# Patient Record
Sex: MALE | Race: WHITE | NOT HISPANIC OR LATINO
[De-identification: names, ages, dates, MRNs, and addresses within clinical notes are randomized per-mention and may not be internally consistent; named-entity substitution may affect disease eponyms.]

---

## 2022-07-29 ENCOUNTER — APPOINTMENT (OUTPATIENT)
Dept: VASCULAR SURGERY | Facility: CLINIC | Age: 82
End: 2022-07-29

## 2022-07-29 PROCEDURE — 99203 OFFICE O/P NEW LOW 30 MIN: CPT

## 2022-09-16 ENCOUNTER — APPOINTMENT (OUTPATIENT)
Dept: VASCULAR SURGERY | Facility: CLINIC | Age: 82
End: 2022-09-16

## 2022-09-16 PROCEDURE — 99213 OFFICE O/P EST LOW 20 MIN: CPT

## 2022-12-06 ENCOUNTER — TRANSCRIPTION ENCOUNTER (OUTPATIENT)
Age: 82
End: 2022-12-06

## 2023-10-20 ENCOUNTER — RESULT REVIEW (OUTPATIENT)
Age: 83
End: 2023-10-20

## 2023-10-20 ENCOUNTER — TRANSCRIPTION ENCOUNTER (OUTPATIENT)
Age: 83
End: 2023-10-20

## 2023-10-23 DIAGNOSIS — I35.0 NONRHEUMATIC AORTIC (VALVE) STENOSIS: ICD-10-CM

## 2023-10-23 PROBLEM — Z00.00 ENCOUNTER FOR PREVENTIVE HEALTH EXAMINATION: Status: ACTIVE | Noted: 2023-10-23

## 2023-10-30 ENCOUNTER — RESULT REVIEW (OUTPATIENT)
Age: 83
End: 2023-10-30

## 2023-11-07 ENCOUNTER — APPOINTMENT (OUTPATIENT)
Dept: CARDIOTHORACIC SURGERY | Facility: CLINIC | Age: 83
End: 2023-11-07

## 2023-11-13 ENCOUNTER — APPOINTMENT (OUTPATIENT)
Dept: CARDIOTHORACIC SURGERY | Facility: CLINIC | Age: 83
End: 2023-11-13
Payer: MEDICARE

## 2023-11-13 DIAGNOSIS — Z86.39 PERSONAL HISTORY OF OTHER ENDOCRINE, NUTRITIONAL AND METABOLIC DISEASE: ICD-10-CM

## 2023-11-13 DIAGNOSIS — H71.92 UNSPECIFIED CHOLESTEATOMA, LEFT EAR: ICD-10-CM

## 2023-11-13 DIAGNOSIS — Z95.5 PRESENCE OF CORONARY ANGIOPLASTY IMPLANT AND GRAFT: ICD-10-CM

## 2023-11-13 DIAGNOSIS — Z87.891 PERSONAL HISTORY OF NICOTINE DEPENDENCE: ICD-10-CM

## 2023-11-13 DIAGNOSIS — Z86.69 PERSONAL HISTORY OF OTHER DISEASES OF THE NERVOUS SYSTEM AND SENSE ORGANS: ICD-10-CM

## 2023-11-13 DIAGNOSIS — Z87.438 PERSONAL HISTORY OF OTHER DISEASES OF MALE GENITAL ORGANS: ICD-10-CM

## 2023-11-13 DIAGNOSIS — Z87.2 PERSONAL HISTORY OF DISEASES OF THE SKIN AND SUBCUTANEOUS TISSUE: ICD-10-CM

## 2023-11-13 DIAGNOSIS — E78.5 HYPERLIPIDEMIA, UNSPECIFIED: ICD-10-CM

## 2023-11-13 DIAGNOSIS — Z86.79 PERSONAL HISTORY OF OTHER DISEASES OF THE CIRCULATORY SYSTEM: ICD-10-CM

## 2023-11-13 PROCEDURE — 99204 OFFICE O/P NEW MOD 45 MIN: CPT | Mod: 95

## 2023-11-14 PROBLEM — Z87.2 HISTORY OF PSORIATIC ARTHRITIS: Status: RESOLVED | Noted: 2023-11-14 | Resolved: 2023-11-14

## 2023-11-14 PROBLEM — H71.92 CHOLESTEATOMA OF LEFT EAR: Status: RESOLVED | Noted: 2023-11-14 | Resolved: 2023-11-14

## 2023-11-14 PROBLEM — Z86.69 HISTORY OF PERIPHERAL NEUROPATHY: Status: RESOLVED | Noted: 2023-11-14 | Resolved: 2023-11-14

## 2023-11-14 PROBLEM — Z86.39 HISTORY OF HYPOTHYROIDISM: Status: RESOLVED | Noted: 2023-11-14 | Resolved: 2023-11-14

## 2023-11-14 PROBLEM — Z95.5 PRESENCE OF STENT IN CORONARY ARTERY: Status: RESOLVED | Noted: 2023-11-14 | Resolved: 2023-11-14

## 2023-11-14 PROBLEM — Z86.79 HISTORY OF CORONARY ARTERY DISEASE: Status: RESOLVED | Noted: 2023-11-14 | Resolved: 2023-11-14

## 2023-11-14 PROBLEM — Z87.891 FORMER SMOKER: Status: ACTIVE | Noted: 2023-11-14

## 2023-11-14 PROBLEM — E78.5 DYSLIPIDEMIA: Status: RESOLVED | Noted: 2023-11-14 | Resolved: 2023-11-14

## 2023-11-14 PROBLEM — Z87.438 HISTORY OF BPH: Status: RESOLVED | Noted: 2023-11-14 | Resolved: 2023-11-14

## 2023-11-14 RX ORDER — MODAFINIL 200 MG/1
200 TABLET ORAL
Refills: 0 | Status: ACTIVE | COMMUNITY

## 2023-11-14 RX ORDER — CLOPIDOGREL 75 MG/1
75 TABLET, FILM COATED ORAL DAILY
Refills: 0 | Status: ACTIVE | COMMUNITY

## 2023-11-14 RX ORDER — ATORVASTATIN CALCIUM 40 MG/1
40 TABLET, FILM COATED ORAL DAILY
Refills: 0 | Status: ACTIVE | COMMUNITY

## 2023-11-14 RX ORDER — LEVOTHYROXINE SODIUM 0.09 MG/1
88 TABLET ORAL DAILY
Refills: 0 | Status: ACTIVE | COMMUNITY

## 2023-11-14 RX ORDER — METHOTREXATE 2.5 MG/1
2.5 TABLET ORAL
Refills: 0 | Status: ACTIVE | COMMUNITY

## 2023-11-14 RX ORDER — TAMSULOSIN HYDROCHLORIDE 0.4 MG/1
0.4 CAPSULE ORAL DAILY
Refills: 0 | Status: ACTIVE | COMMUNITY

## 2023-12-03 ENCOUNTER — NON-APPOINTMENT (OUTPATIENT)
Age: 83
End: 2023-12-03

## 2023-12-04 ENCOUNTER — TRANSCRIPTION ENCOUNTER (OUTPATIENT)
Age: 83
End: 2023-12-04

## 2023-12-04 VITALS
SYSTOLIC BLOOD PRESSURE: 144 MMHG | RESPIRATION RATE: 16 BRPM | TEMPERATURE: 98 F | WEIGHT: 175.05 LBS | OXYGEN SATURATION: 97 % | DIASTOLIC BLOOD PRESSURE: 69 MMHG | HEART RATE: 59 BPM | HEIGHT: 69 IN

## 2023-12-04 RX ORDER — ATORVASTATIN CALCIUM 80 MG/1
1 TABLET, FILM COATED ORAL
Refills: 0 | DISCHARGE

## 2023-12-04 RX ORDER — TAMSULOSIN HYDROCHLORIDE 0.4 MG/1
1 CAPSULE ORAL
Refills: 0 | DISCHARGE

## 2023-12-04 NOTE — PRE-ANESTHESIA EVALUATION ADULT - LAST ECHOCARDIOGRAM
9/20/23.  chart review.  LVEF 55%, moderate BIN without LVOT gradient, NL RV, mild left atrial enlargement, severe AS, PG 63mmHg, MG 41mmHg, AYLA 1.1cm2, mild AI, mild MR.  aortic root 4cm, ascending aorta 4cm.

## 2023-12-04 NOTE — PATIENT PROFILE ADULT - FALL HARM RISK - HARM RISK INTERVENTIONS
Communicate Risk of Fall with Harm to all staff/Monitor gait and stability/Reinforce activity limits and safety measures with patient and family/Tailored Fall Risk Interventions/Use of alarms - bed, chair and/or voice tab/Visual Cue: Yellow wristband and red socks/Bed in lowest position, wheels locked, appropriate side rails in place/Call bell, personal items and telephone in reach/Instruct patient to call for assistance before getting out of bed or chair/Non-slip footwear when patient is out of bed/[place] to call system/Physically safe environment - no spills, clutter or unnecessary equipment/Purposeful Proactive Rounding/Room/bathroom lighting operational, light cord in reach Communicate Risk of Fall with Harm to all staff/Monitor gait and stability/Reinforce activity limits and safety measures with patient and family/Tailored Fall Risk Interventions/Use of alarms - bed, chair and/or voice tab/Visual Cue: Yellow wristband and red socks/Bed in lowest position, wheels locked, appropriate side rails in place/Call bell, personal items and telephone in reach/Instruct patient to call for assistance before getting out of bed or chair/Non-slip footwear when patient is out of bed/[place] to call system/Physically safe environment - no spills, clutter or unnecessary equipment/Purposeful Proactive Rounding/Room/bathroom lighting operational, light cord in reach

## 2023-12-04 NOTE — PRE-ANESTHESIA EVALUATION ADULT - NSANTHOSAYNRD_GEN_A_CORE
Yes No. MICHELINE screening performed.  STOP BANG Legend: 0-2 = LOW Risk; 3-4 = INTERMEDIATE Risk; 5-8 = HIGH Risk

## 2023-12-04 NOTE — PRE-ANESTHESIA EVALUATION ADULT - NSANTHADDINFOFT_GEN_ALL_CORE
H&P Adult [Charted Location: [facility] 03 10] [Authored: 05-Dec-2023 06:50]- for Visit: [id number], In Progress, Revised, Signed w/additional Signatures Pending, Available to Patient    History and Physical:   Source of Information	Chart(s), Patient       Patient Identity:  · Birth Sex	Male  · Patient's Sexual Orientation	Heterosexual  · Patient's Gender Identity	Male  · Patient's Preferred Pronoun	Him/He     History of Present Illness:  Reason for Admission: tavr  History of Present Illness:   83 Y male, former smoker, with PHM of psoriatic arthritis (on methotrexate), lumbar spinal stenosis, neuropathy, MICHELINE, hypothyroidism, BPH, HTN, dyslipidemia, CAD s/p PCI/OLESYA of the prox to mid LAD (10/19/23), HFpEF, and symptomatic (incresaed fatigability and GARSIA) severe AS (TTE 9/20/23: LVEF 55%, PV 3.98, MG 41) referred for consultation of surgical treatment options for aortic stenosis. Patient endorsed having known AS for 3 years and have been watchful waiting. He then had his surveillance TTE done in September 2023 that showed progression of his aortic stenosis. In terms of symptoms, he endorsed worsening fatigue over the past 6 months and relative new dyspnea on exertion when walking uphill. He has an exercise tolerance of 1 block limited by his arthritis pain. He denies any recent chest pain, PND, orthopnea, LE edema, palpitations, lightheadedness/dizziness or syncope, or recent heart failure admission. He completed PST and presents today for TAVR          Review of Systems:  Review of Systems: Endocrine: as noted in HPI.    Hematologic/Lymphatic: as noted in HPI.      Allergies and Intolerances:        Allergies:  	No Known Allergies:     Home Medications:   * Patient Currently Takes Medications as of 05-Dec-2023 08:15 documented in Structured Notes  · 	methotrexate 2.5 mg oral tablet: Last Dose Taken: 27-Nov-2023 , 6 tab(s) orally once a week  · 	metoprolol succinate 50 mg oral tablet, extended release: Last Dose Taken: 04-Dec-2023 PM, 1 tab(s) orally once a day  · 	Aspir 81 oral delayed release tablet: Last Dose Taken: 05-Dec-2023 AM, 1 tab(s) orally once a day  · 	folic acid 1 mg oral tablet: Last Dose Taken: 04-Dec-2023 AM, 3 tab(s) orally once a day  · 	levothyroxine 88 mcg (0.088 mg) oral tablet: Last Dose Taken: 05-Dec-2023 AM, 1 tab(s) orally once a day  · 	atorvastatin 40 mg oral tablet: Last Dose Taken: 04-Dec-2023 PM, 1 tab(s) orally once a day  · 	tamsulosin 0.4 mg oral capsule: Last Dose Taken: 04-Dec-2023 PM, 1 cap(s) orally once a day  · 	Plavix 75 mg oral tablet: Last Dose Taken: 05-Dec-2023 AM, 1 tab(s) orally once a day  · 	modafinil 200 mg oral tablet: Last Dose Taken: November, 2023 , 1 tab(s) orally prn    .    Patient History:    Past Medical, Past Surgical, and Family History:  PAST MEDICAL HISTORY:  CAD (coronary artery disease)     Dyslipidemia     History of BPH     HTN (hypertension)     Hypothyroidism     Lumbar spinal stenosis     Neuropathy     MICHELINE (obstructive sleep apnea)     Psoriatic arthritis.     PAST SURGICAL HISTORY:  H/O total knee replacement, right     S/P appendectomy     S/P cholecystectomy     S/P hip replacement, left.     Social History:  · Substance use	No  · Social History (marital status, living situation, occupation, and sexual history)	former smoker     Tobacco Screening:  · Core Measure Site	No    Risk Assessment:    Present on Admission:  Deep Venous Thrombosis	no  Pulmonary Embolus	no     HIV Screening:  · In accordance with [place] State law, we offer every patient who comes to our ED an HIV test. Would you like to be tested today?	Opt out    Physical Exam:   Physical Exam: General: Sitting in chair NAD  Head: NCAT  EENT:  MMM EOMI neck supple   NEck : Supple   Neurological: A&O x3, no focal deficits, DORSEY  Cardiovascular: RRR  Respiratory: Non-labored breathing on RA   Gastrointestinal:  soft non-distended  Extremities: WWP  Psych: Mood and affect appropriate       Labs and Results:  Labs, Radiology, Cardiology, and Other Results: CTA Cardiac 11/01/23  IMPRESSION:   Cardiac:   1. Please note this study was not optimized for the evaluation of the coronary arteries.   2. Patent stent in proximal to mid LAD.   3. Remaining segments demonstrate non obstructive coronary artery disease.   4. Calcified trileaflet aortic valve.   5. No evidence of left atrial appendage thrombus.     Non-cardiac:   See separately dictated CTA of the chest, abdomen, and pelvis     CTA Chest, Abdomen and Pelvis 11/1/23  IMPRESSION:   Study performed for purposes of pre-TAVR procedural planning.   Aortoiliac atheromatous disease. Focal luminal thrombus projecting into the lumen of the descending aorta resulting in mild stenosis.   Nodularity and reticular opacities in the right lower lobe may be due to active versus chronic infectious/inflammatory etiology.   Additional chronic/incidental findings as above.     Carotid US 10/31/23  IMPRESSION:   No hemodynamically significant internal carotid artery stenoses.   There is a flow-limiting stenosis of the right external carotid artery.   There may be a proximal stenosis of the right vertebral artery.     Cardiac cath at [facility] 10/19/23  1. Left cath performed via the right femoral vein. Coronary angio, IFR, and PCI performed via the right radial artery.   2. 70% proximal to mid LAD stenosis, IFR positive at 0.85, treated with OLESYA x1 (3.25 x 15 mm Xience Skypoint). No significant disease in the remainder of the vessels. Normal filling pressures and preserved cardiac output and index.    TTE 9/28/23: LVEF >55%, moderate asymmetric septal hypertrophy with septal thickness 1.6-1.9cm, normal RV size and function, mild LAE, severe AS with peak/mean transvalvular gradients of 63 mmHg/41mm Hg and a calculated valve area of 1.1cm2. There is mild AI. There is KIMBERLY of the chordae tendineae of the MV leaflet. Mild MR. The gradient across the LVOT was normal at rest and with Valsalva maneuver. Mild TR. The aortic root is dilated measuring 4cm. Dilated ascending aorta measuring 4cm. Since the previous study there is now AS and aortic dilatation.     Regadenoson Stress Test 2/13/20  SUMMARY.   1. Clinically and electrocardiographically negative response to Lexiscan   2. SPECT scans do not reveal evidence of myocardial scarring or inducible ischemia. Overall left ventricular function is well-preserved.    Assessment and Plan:   · Completed VTE Risk Assessment(s)	Surgical Assessment Completed on: 05-Dec-2023 06:56  · Completed VTE Risk Assessment(s)	Refer to the Assessment tab to view/cancel completed assessment.     Assessment:  · Assessment	  83 Y male, former smoker, with PHM of psoriatic arthritis (on methotrexate), lumbar spinal stenosis, neuropathy, MICHELINE, hypothyroidism, BPH, HTN, dyslipidemia, CAD s/p PCI/OLESYA of the prox to mid LAD (10/19/23), HFpEF, and symptomatic (incresaed fatigability and GARSIA) severe AS (TTE 9/20/23: LVEF 55%, PV 3.98, MG 41) who presents today for TAVR.     -Pre-op labs/EKG  -TAVR today  - Admit postop to CTS              Electronic Signatures:  Ellie Little (PA)  (Signed 05-Dec-2023 08:18)  	Authored: History and Physical, History of Present Illness, Allergies/Medications, Patient History, Risk Assessment, Physical Exam, Labs and Results, Assessment and Plan  Niranjan Laguerre)  (Signature Pending)  	Co-Signer: History and Physical, History of Present Illness, Allergies/Medications, Patient History, Risk Assessment, Labs and Results, Assessment and Plan      Last Updated: 05-Dec-2023 08:18 by Ellie Little (PA) H&P Adult [Charted Location: [facility] 03 10] [Authored: 05-Dec-2023 06:50]- for Visit: [id number], In Progress, Revised, Signed w/additional Signatures Pending, Available to Patient    History and Physical:   Source of Information	Chart(s), Patient       Patient Identity:  · Birth Sex	Male  · Patient's Sexual Orientation	Heterosexual  · Patient's Gender Identity	Male  · Patient's Preferred Pronoun	Him/He     History of Present Illness:  Reason for Admission: tavr  History of Present Illness:   83 Y male, former smoker, with PHM of psoriatic arthritis (on methotrexate), lumbar spinal stenosis, neuropathy, MICHELINE, hypothyroidism, BPH, HTN, dyslipidemia, CAD s/p PCI/OLESYA of the prox to mid LAD (10/19/23), HFpEF, and symptomatic (incresaed fatigability and GARSIA) severe AS (TTE 9/20/23: LVEF 55%, PV 3.98, MG 41) referred for consultation of surgical treatment options for aortic stenosis. Patient endorsed having known AS for 3 years and have been watchful waiting. He then had his surveillance TTE done in September 2023 that showed progression of his aortic stenosis. In terms of symptoms, he endorsed worsening fatigue over the past 6 months and relative new dyspnea on exertion when walking uphill. He has an exercise tolerance of 1 block limited by his arthritis pain. He denies any recent chest pain, PND, orthopnea, LE edema, palpitations, lightheadedness/dizziness or syncope, or recent heart failure admission. He completed PST and presents today for TAVR          Review of Systems:  Review of Systems: Endocrine: as noted in HPI.    Hematologic/Lymphatic: as noted in HPI.      Allergies and Intolerances:        Allergies:  	No Known Allergies:     Home Medications:   * Patient Currently Takes Medications as of 05-Dec-2023 08:15 documented in Structured Notes  · 	methotrexate 2.5 mg oral tablet: Last Dose Taken: 27-Nov-2023 , 6 tab(s) orally once a week  · 	metoprolol succinate 50 mg oral tablet, extended release: Last Dose Taken: 04-Dec-2023 PM, 1 tab(s) orally once a day  · 	Aspir 81 oral delayed release tablet: Last Dose Taken: 05-Dec-2023 AM, 1 tab(s) orally once a day  · 	folic acid 1 mg oral tablet: Last Dose Taken: 04-Dec-2023 AM, 3 tab(s) orally once a day  · 	levothyroxine 88 mcg (0.088 mg) oral tablet: Last Dose Taken: 05-Dec-2023 AM, 1 tab(s) orally once a day  · 	atorvastatin 40 mg oral tablet: Last Dose Taken: 04-Dec-2023 PM, 1 tab(s) orally once a day  · 	tamsulosin 0.4 mg oral capsule: Last Dose Taken: 04-Dec-2023 PM, 1 cap(s) orally once a day  · 	Plavix 75 mg oral tablet: Last Dose Taken: 05-Dec-2023 AM, 1 tab(s) orally once a day  · 	modafinil 200 mg oral tablet: Last Dose Taken: November, 2023 , 1 tab(s) orally prn    .    Patient History:    Past Medical, Past Surgical, and Family History:  PAST MEDICAL HISTORY:  CAD (coronary artery disease)     Dyslipidemia     History of BPH     HTN (hypertension)     Hypothyroidism     Lumbar spinal stenosis     Neuropathy     MICHELINE (obstructive sleep apnea)     Psoriatic arthritis.     PAST SURGICAL HISTORY:  H/O total knee replacement, right     S/P appendectomy     S/P cholecystectomy     S/P hip replacement, left.     Social History:  · Substance use	No  · Social History (marital status, living situation, occupation, and sexual history)	former smoker     Tobacco Screening:  · Core Measure Site	No    Risk Assessment:    Present on Admission:  Deep Venous Thrombosis	no  Pulmonary Embolus	no     HIV Screening:  · In accordance with [place] law, we offer every patient who comes to our ED an HIV test. Would you like to be tested today?	Opt out    Physical Exam:   Physical Exam: General: Sitting in chair NAD  Head: NCAT  EENT:  MMM EOMI neck supple   NEck : Supple   Neurological: A&O x3, no focal deficits, DORSEY  Cardiovascular: RRR  Respiratory: Non-labored breathing on RA   Gastrointestinal:  soft non-distended  Extremities: WWP  Psych: Mood and affect appropriate       Labs and Results:  Labs, Radiology, Cardiology, and Other Results: CTA Cardiac 11/01/23  IMPRESSION:   Cardiac:   1. Please note this study was not optimized for the evaluation of the coronary arteries.   2. Patent stent in proximal to mid LAD.   3. Remaining segments demonstrate non obstructive coronary artery disease.   4. Calcified trileaflet aortic valve.   5. No evidence of left atrial appendage thrombus.     Non-cardiac:   See separately dictated CTA of the chest, abdomen, and pelvis     CTA Chest, Abdomen and Pelvis 11/1/23  IMPRESSION:   Study performed for purposes of pre-TAVR procedural planning.   Aortoiliac atheromatous disease. Focal luminal thrombus projecting into the lumen of the descending aorta resulting in mild stenosis.   Nodularity and reticular opacities in the right lower lobe may be due to active versus chronic infectious/inflammatory etiology.   Additional chronic/incidental findings as above.     Carotid US 10/31/23  IMPRESSION:   No hemodynamically significant internal carotid artery stenoses.   There is a flow-limiting stenosis of the right external carotid artery.   There may be a proximal stenosis of the right vertebral artery.     Cardiac cath at [facility] 10/19/23  1. Left cath performed via the right femoral vein. Coronary angio, IFR, and PCI performed via the right radial artery.   2. 70% proximal to mid LAD stenosis, IFR positive at 0.85, treated with OLESYA x1 (3.25 x 15 mm Xience Skypoint). No significant disease in the remainder of the vessels. Normal filling pressures and preserved cardiac output and index.    TTE 9/28/23: LVEF >55%, moderate asymmetric septal hypertrophy with septal thickness 1.6-1.9cm, normal RV size and function, mild LAE, severe AS with peak/mean transvalvular gradients of 63 mmHg/41mm Hg and a calculated valve area of 1.1cm2. There is mild AI. There is KIMBERLY of the chordae tendineae of the MV leaflet. Mild MR. The gradient across the LVOT was normal at rest and with Valsalva maneuver. Mild TR. The aortic root is dilated measuring 4cm. Dilated ascending aorta measuring 4cm. Since the previous study there is now AS and aortic dilatation.     Regadenoson Stress Test 2/13/20  SUMMARY.   1. Clinically and electrocardiographically negative response to Lexiscan   2. SPECT scans do not reveal evidence of myocardial scarring or inducible ischemia. Overall left ventricular function is well-preserved.    Assessment and Plan:   · Completed VTE Risk Assessment(s)	Surgical Assessment Completed on: 05-Dec-2023 06:56  · Completed VTE Risk Assessment(s)	Refer to the Assessment tab to view/cancel completed assessment.     Assessment:  · Assessment	  83 Y male, former smoker, with PHM of psoriatic arthritis (on methotrexate), lumbar spinal stenosis, neuropathy, MICHELINE, hypothyroidism, BPH, HTN, dyslipidemia, CAD s/p PCI/OLESYA of the prox to mid LAD (10/19/23), HFpEF, and symptomatic (incresaed fatigability and GARSIA) severe AS (TTE 9/20/23: LVEF 55%, PV 3.98, MG 41) who presents today for TAVR.     -Pre-op labs/EKG  -TAVR today  - Admit postop to CTS              Electronic Signatures:  Ellie Little (PA)  (Signed 05-Dec-2023 08:18)  	Authored: History and Physical, History of Present Illness, Allergies/Medications, Patient History, Risk Assessment, Physical Exam, Labs and Results, Assessment and Plan  Niranjan Laguerre)  (Signature Pending)  	Co-Signer: History and Physical, History of Present Illness, Allergies/Medications, Patient History, Risk Assessment, Labs and Results, Assessment and Plan      Last Updated: 05-Dec-2023 08:18 by Ellie Little (PA)

## 2023-12-05 ENCOUNTER — NON-APPOINTMENT (OUTPATIENT)
Age: 83
End: 2023-12-05

## 2023-12-05 ENCOUNTER — INPATIENT (INPATIENT)
Facility: HOSPITAL | Age: 83
LOS: 0 days | Discharge: HOME | DRG: 267 | End: 2023-12-06
Attending: INTERNAL MEDICINE | Admitting: INTERNAL MEDICINE
Payer: MEDICARE

## 2023-12-05 ENCOUNTER — APPOINTMENT (OUTPATIENT)
Dept: CARDIOTHORACIC SURGERY | Facility: HOSPITAL | Age: 83
End: 2023-12-05

## 2023-12-05 ENCOUNTER — TRANSCRIPTION ENCOUNTER (OUTPATIENT)
Age: 83
End: 2023-12-05

## 2023-12-05 DIAGNOSIS — Z90.49 ACQUIRED ABSENCE OF OTHER SPECIFIED PARTS OF DIGESTIVE TRACT: Chronic | ICD-10-CM

## 2023-12-05 DIAGNOSIS — Z96.651 PRESENCE OF RIGHT ARTIFICIAL KNEE JOINT: Chronic | ICD-10-CM

## 2023-12-05 DIAGNOSIS — Z96.642 PRESENCE OF LEFT ARTIFICIAL HIP JOINT: Chronic | ICD-10-CM

## 2023-12-05 LAB
A1C WITH ESTIMATED AVERAGE GLUCOSE RESULT: 5.4 % — SIGNIFICANT CHANGE UP (ref 4–5.6)
A1C WITH ESTIMATED AVERAGE GLUCOSE RESULT: 5.4 % — SIGNIFICANT CHANGE UP (ref 4–5.6)
ALBUMIN SERPL ELPH-MCNC: 3.4 G/DL — SIGNIFICANT CHANGE UP (ref 3.3–5)
ALBUMIN SERPL ELPH-MCNC: 3.4 G/DL — SIGNIFICANT CHANGE UP (ref 3.3–5)
ALBUMIN SERPL ELPH-MCNC: 4.3 G/DL — SIGNIFICANT CHANGE UP (ref 3.3–5)
ALBUMIN SERPL ELPH-MCNC: 4.3 G/DL — SIGNIFICANT CHANGE UP (ref 3.3–5)
ALP SERPL-CCNC: 125 U/L — HIGH (ref 40–120)
ALP SERPL-CCNC: 125 U/L — HIGH (ref 40–120)
ALP SERPL-CCNC: 93 U/L — SIGNIFICANT CHANGE UP (ref 40–120)
ALP SERPL-CCNC: 93 U/L — SIGNIFICANT CHANGE UP (ref 40–120)
ALT FLD-CCNC: 16 U/L — SIGNIFICANT CHANGE UP (ref 10–45)
ALT FLD-CCNC: 16 U/L — SIGNIFICANT CHANGE UP (ref 10–45)
ALT FLD-CCNC: 21 U/L — SIGNIFICANT CHANGE UP (ref 10–45)
ALT FLD-CCNC: 21 U/L — SIGNIFICANT CHANGE UP (ref 10–45)
ANION GAP SERPL CALC-SCNC: 7 MMOL/L — SIGNIFICANT CHANGE UP (ref 5–17)
ANION GAP SERPL CALC-SCNC: 7 MMOL/L — SIGNIFICANT CHANGE UP (ref 5–17)
ANION GAP SERPL CALC-SCNC: 9 MMOL/L — SIGNIFICANT CHANGE UP (ref 5–17)
ANION GAP SERPL CALC-SCNC: 9 MMOL/L — SIGNIFICANT CHANGE UP (ref 5–17)
APTT BLD: 30.2 SEC — SIGNIFICANT CHANGE UP (ref 24.5–35.6)
APTT BLD: 30.2 SEC — SIGNIFICANT CHANGE UP (ref 24.5–35.6)
APTT BLD: 50 SEC — HIGH (ref 24.5–35.6)
APTT BLD: 50 SEC — HIGH (ref 24.5–35.6)
AST SERPL-CCNC: 20 U/L — SIGNIFICANT CHANGE UP (ref 10–40)
AST SERPL-CCNC: 20 U/L — SIGNIFICANT CHANGE UP (ref 10–40)
AST SERPL-CCNC: 25 U/L — SIGNIFICANT CHANGE UP (ref 10–40)
AST SERPL-CCNC: 25 U/L — SIGNIFICANT CHANGE UP (ref 10–40)
BASOPHILS # BLD AUTO: 0.04 K/UL — SIGNIFICANT CHANGE UP (ref 0–0.2)
BASOPHILS # BLD AUTO: 0.04 K/UL — SIGNIFICANT CHANGE UP (ref 0–0.2)
BASOPHILS NFR BLD AUTO: 0.6 % — SIGNIFICANT CHANGE UP (ref 0–2)
BASOPHILS NFR BLD AUTO: 0.6 % — SIGNIFICANT CHANGE UP (ref 0–2)
BILIRUB SERPL-MCNC: 0.5 MG/DL — SIGNIFICANT CHANGE UP (ref 0.2–1.2)
BILIRUB SERPL-MCNC: 0.5 MG/DL — SIGNIFICANT CHANGE UP (ref 0.2–1.2)
BILIRUB SERPL-MCNC: 0.8 MG/DL — SIGNIFICANT CHANGE UP (ref 0.2–1.2)
BILIRUB SERPL-MCNC: 0.8 MG/DL — SIGNIFICANT CHANGE UP (ref 0.2–1.2)
BUN SERPL-MCNC: 21 MG/DL — SIGNIFICANT CHANGE UP (ref 7–23)
BUN SERPL-MCNC: 21 MG/DL — SIGNIFICANT CHANGE UP (ref 7–23)
BUN SERPL-MCNC: 23 MG/DL — SIGNIFICANT CHANGE UP (ref 7–23)
BUN SERPL-MCNC: 23 MG/DL — SIGNIFICANT CHANGE UP (ref 7–23)
CALCIUM SERPL-MCNC: 9.1 MG/DL — SIGNIFICANT CHANGE UP (ref 8.4–10.5)
CALCIUM SERPL-MCNC: 9.1 MG/DL — SIGNIFICANT CHANGE UP (ref 8.4–10.5)
CALCIUM SERPL-MCNC: 9.4 MG/DL — SIGNIFICANT CHANGE UP (ref 8.4–10.5)
CALCIUM SERPL-MCNC: 9.4 MG/DL — SIGNIFICANT CHANGE UP (ref 8.4–10.5)
CHLORIDE SERPL-SCNC: 106 MMOL/L — SIGNIFICANT CHANGE UP (ref 96–108)
CHLORIDE SERPL-SCNC: 106 MMOL/L — SIGNIFICANT CHANGE UP (ref 96–108)
CHLORIDE SERPL-SCNC: 110 MMOL/L — HIGH (ref 96–108)
CHLORIDE SERPL-SCNC: 110 MMOL/L — HIGH (ref 96–108)
CO2 SERPL-SCNC: 24 MMOL/L — SIGNIFICANT CHANGE UP (ref 22–31)
CO2 SERPL-SCNC: 24 MMOL/L — SIGNIFICANT CHANGE UP (ref 22–31)
CO2 SERPL-SCNC: 25 MMOL/L — SIGNIFICANT CHANGE UP (ref 22–31)
CO2 SERPL-SCNC: 25 MMOL/L — SIGNIFICANT CHANGE UP (ref 22–31)
CREAT SERPL-MCNC: 1.25 MG/DL — SIGNIFICANT CHANGE UP (ref 0.5–1.3)
CREAT SERPL-MCNC: 1.25 MG/DL — SIGNIFICANT CHANGE UP (ref 0.5–1.3)
CREAT SERPL-MCNC: 1.31 MG/DL — HIGH (ref 0.5–1.3)
CREAT SERPL-MCNC: 1.31 MG/DL — HIGH (ref 0.5–1.3)
EGFR: 54 ML/MIN/1.73M2 — LOW
EGFR: 54 ML/MIN/1.73M2 — LOW
EGFR: 57 ML/MIN/1.73M2 — LOW
EGFR: 57 ML/MIN/1.73M2 — LOW
EOSINOPHIL # BLD AUTO: 0.18 K/UL — SIGNIFICANT CHANGE UP (ref 0–0.5)
EOSINOPHIL # BLD AUTO: 0.18 K/UL — SIGNIFICANT CHANGE UP (ref 0–0.5)
EOSINOPHIL NFR BLD AUTO: 2.7 % — SIGNIFICANT CHANGE UP (ref 0–6)
EOSINOPHIL NFR BLD AUTO: 2.7 % — SIGNIFICANT CHANGE UP (ref 0–6)
ESTIMATED AVERAGE GLUCOSE: 108 MG/DL — SIGNIFICANT CHANGE UP (ref 68–114)
ESTIMATED AVERAGE GLUCOSE: 108 MG/DL — SIGNIFICANT CHANGE UP (ref 68–114)
GAS PNL BLDA: SIGNIFICANT CHANGE UP
GAS PNL BLDA: SIGNIFICANT CHANGE UP
GLUCOSE SERPL-MCNC: 101 MG/DL — HIGH (ref 70–99)
GLUCOSE SERPL-MCNC: 101 MG/DL — HIGH (ref 70–99)
GLUCOSE SERPL-MCNC: 105 MG/DL — HIGH (ref 70–99)
GLUCOSE SERPL-MCNC: 105 MG/DL — HIGH (ref 70–99)
HCT VFR BLD CALC: 31 % — LOW (ref 39–50)
HCT VFR BLD CALC: 31 % — LOW (ref 39–50)
HCT VFR BLD CALC: 39.1 % — SIGNIFICANT CHANGE UP (ref 39–50)
HCT VFR BLD CALC: 39.1 % — SIGNIFICANT CHANGE UP (ref 39–50)
HGB BLD-MCNC: 10.6 G/DL — LOW (ref 13–17)
HGB BLD-MCNC: 10.6 G/DL — LOW (ref 13–17)
HGB BLD-MCNC: 13.2 G/DL — SIGNIFICANT CHANGE UP (ref 13–17)
HGB BLD-MCNC: 13.2 G/DL — SIGNIFICANT CHANGE UP (ref 13–17)
IMM GRANULOCYTES NFR BLD AUTO: 0.9 % — SIGNIFICANT CHANGE UP (ref 0–0.9)
IMM GRANULOCYTES NFR BLD AUTO: 0.9 % — SIGNIFICANT CHANGE UP (ref 0–0.9)
INR BLD: 1.03 — SIGNIFICANT CHANGE UP (ref 0.85–1.18)
INR BLD: 1.03 — SIGNIFICANT CHANGE UP (ref 0.85–1.18)
INR BLD: 1.22 — HIGH (ref 0.85–1.18)
INR BLD: 1.22 — HIGH (ref 0.85–1.18)
LYMPHOCYTES # BLD AUTO: 1 K/UL — SIGNIFICANT CHANGE UP (ref 1–3.3)
LYMPHOCYTES # BLD AUTO: 1 K/UL — SIGNIFICANT CHANGE UP (ref 1–3.3)
LYMPHOCYTES # BLD AUTO: 15 % — SIGNIFICANT CHANGE UP (ref 13–44)
LYMPHOCYTES # BLD AUTO: 15 % — SIGNIFICANT CHANGE UP (ref 13–44)
MAGNESIUM SERPL-MCNC: 1.6 MG/DL — SIGNIFICANT CHANGE UP (ref 1.6–2.6)
MAGNESIUM SERPL-MCNC: 1.6 MG/DL — SIGNIFICANT CHANGE UP (ref 1.6–2.6)
MCHC RBC-ENTMCNC: 33.8 GM/DL — SIGNIFICANT CHANGE UP (ref 32–36)
MCHC RBC-ENTMCNC: 33.8 GM/DL — SIGNIFICANT CHANGE UP (ref 32–36)
MCHC RBC-ENTMCNC: 33.9 PG — SIGNIFICANT CHANGE UP (ref 27–34)
MCHC RBC-ENTMCNC: 34.2 GM/DL — SIGNIFICANT CHANGE UP (ref 32–36)
MCHC RBC-ENTMCNC: 34.2 GM/DL — SIGNIFICANT CHANGE UP (ref 32–36)
MCV RBC AUTO: 100.5 FL — HIGH (ref 80–100)
MCV RBC AUTO: 100.5 FL — HIGH (ref 80–100)
MCV RBC AUTO: 99 FL — SIGNIFICANT CHANGE UP (ref 80–100)
MCV RBC AUTO: 99 FL — SIGNIFICANT CHANGE UP (ref 80–100)
MONOCYTES # BLD AUTO: 0.39 K/UL — SIGNIFICANT CHANGE UP (ref 0–0.9)
MONOCYTES # BLD AUTO: 0.39 K/UL — SIGNIFICANT CHANGE UP (ref 0–0.9)
MONOCYTES NFR BLD AUTO: 5.8 % — SIGNIFICANT CHANGE UP (ref 2–14)
MONOCYTES NFR BLD AUTO: 5.8 % — SIGNIFICANT CHANGE UP (ref 2–14)
NEUTROPHILS # BLD AUTO: 5.01 K/UL — SIGNIFICANT CHANGE UP (ref 1.8–7.4)
NEUTROPHILS # BLD AUTO: 5.01 K/UL — SIGNIFICANT CHANGE UP (ref 1.8–7.4)
NEUTROPHILS NFR BLD AUTO: 75 % — SIGNIFICANT CHANGE UP (ref 43–77)
NEUTROPHILS NFR BLD AUTO: 75 % — SIGNIFICANT CHANGE UP (ref 43–77)
NRBC # BLD: 0 /100 WBCS — SIGNIFICANT CHANGE UP (ref 0–0)
NT-PROBNP SERPL-SCNC: 1313 PG/ML — HIGH (ref 0–300)
NT-PROBNP SERPL-SCNC: 1313 PG/ML — HIGH (ref 0–300)
PLATELET # BLD AUTO: 124 K/UL — LOW (ref 150–400)
PLATELET # BLD AUTO: 124 K/UL — LOW (ref 150–400)
PLATELET # BLD AUTO: 168 K/UL — SIGNIFICANT CHANGE UP (ref 150–400)
PLATELET # BLD AUTO: 168 K/UL — SIGNIFICANT CHANGE UP (ref 150–400)
POTASSIUM SERPL-MCNC: 4.2 MMOL/L — SIGNIFICANT CHANGE UP (ref 3.5–5.3)
POTASSIUM SERPL-SCNC: 4.2 MMOL/L — SIGNIFICANT CHANGE UP (ref 3.5–5.3)
PROT SERPL-MCNC: 5.2 G/DL — LOW (ref 6–8.3)
PROT SERPL-MCNC: 5.2 G/DL — LOW (ref 6–8.3)
PROT SERPL-MCNC: 6.8 G/DL — SIGNIFICANT CHANGE UP (ref 6–8.3)
PROT SERPL-MCNC: 6.8 G/DL — SIGNIFICANT CHANGE UP (ref 6–8.3)
PROTHROM AB SERPL-ACNC: 11.7 SEC — SIGNIFICANT CHANGE UP (ref 9.5–13)
PROTHROM AB SERPL-ACNC: 11.7 SEC — SIGNIFICANT CHANGE UP (ref 9.5–13)
PROTHROM AB SERPL-ACNC: 13.8 SEC — HIGH (ref 9.5–13)
PROTHROM AB SERPL-ACNC: 13.8 SEC — HIGH (ref 9.5–13)
RBC # BLD: 3.13 M/UL — LOW (ref 4.2–5.8)
RBC # BLD: 3.13 M/UL — LOW (ref 4.2–5.8)
RBC # BLD: 3.89 M/UL — LOW (ref 4.2–5.8)
RBC # BLD: 3.89 M/UL — LOW (ref 4.2–5.8)
RBC # FLD: 13.4 % — SIGNIFICANT CHANGE UP (ref 10.3–14.5)
RBC # FLD: 13.4 % — SIGNIFICANT CHANGE UP (ref 10.3–14.5)
RBC # FLD: 13.6 % — SIGNIFICANT CHANGE UP (ref 10.3–14.5)
RBC # FLD: 13.6 % — SIGNIFICANT CHANGE UP (ref 10.3–14.5)
SODIUM SERPL-SCNC: 140 MMOL/L — SIGNIFICANT CHANGE UP (ref 135–145)
SODIUM SERPL-SCNC: 140 MMOL/L — SIGNIFICANT CHANGE UP (ref 135–145)
SODIUM SERPL-SCNC: 141 MMOL/L — SIGNIFICANT CHANGE UP (ref 135–145)
SODIUM SERPL-SCNC: 141 MMOL/L — SIGNIFICANT CHANGE UP (ref 135–145)
TSH SERPL-MCNC: 3.85 UIU/ML — SIGNIFICANT CHANGE UP (ref 0.27–4.2)
TSH SERPL-MCNC: 3.85 UIU/ML — SIGNIFICANT CHANGE UP (ref 0.27–4.2)
WBC # BLD: 6.68 K/UL — SIGNIFICANT CHANGE UP (ref 3.8–10.5)
WBC # BLD: 6.68 K/UL — SIGNIFICANT CHANGE UP (ref 3.8–10.5)
WBC # BLD: 7.84 K/UL — SIGNIFICANT CHANGE UP (ref 3.8–10.5)
WBC # BLD: 7.84 K/UL — SIGNIFICANT CHANGE UP (ref 3.8–10.5)
WBC # FLD AUTO: 6.68 K/UL — SIGNIFICANT CHANGE UP (ref 3.8–10.5)
WBC # FLD AUTO: 6.68 K/UL — SIGNIFICANT CHANGE UP (ref 3.8–10.5)
WBC # FLD AUTO: 7.84 K/UL — SIGNIFICANT CHANGE UP (ref 3.8–10.5)
WBC # FLD AUTO: 7.84 K/UL — SIGNIFICANT CHANGE UP (ref 3.8–10.5)

## 2023-12-05 PROCEDURE — 71045 X-RAY EXAM CHEST 1 VIEW: CPT | Mod: 26

## 2023-12-05 PROCEDURE — 93306 TTE W/DOPPLER COMPLETE: CPT | Mod: 26

## 2023-12-05 PROCEDURE — 33361 REPLACE AORTIC VALVE PERQ: CPT | Mod: 62,Q0

## 2023-12-05 PROCEDURE — 93308 TTE F-UP OR LMTD: CPT | Mod: 26

## 2023-12-05 PROCEDURE — 93010 ELECTROCARDIOGRAM REPORT: CPT

## 2023-12-05 DEVICE — CATH DX PIG 145 INFIN 5FRX110CM: Type: IMPLANTABLE DEVICE | Status: FUNCTIONAL

## 2023-12-05 DEVICE — WIRE GD CONFIDA BRECKER CRV .035X260: Type: IMPLANTABLE DEVICE | Status: FUNCTIONAL

## 2023-12-05 DEVICE — KIT PACE ELCTR BIPOLAR 5FR: Type: IMPLANTABLE DEVICE | Status: FUNCTIONAL

## 2023-12-05 DEVICE — GWIRE JTIP .035X150 STD: Type: IMPLANTABLE DEVICE | Status: FUNCTIONAL

## 2023-12-05 DEVICE — INTRODUCER SHEATH KIT GLIDESHEATH SLENDER FLEX STRAIGHT 21G 6F X 10CM: Type: IMPLANTABLE DEVICE | Status: FUNCTIONAL

## 2023-12-05 DEVICE — GWIRE GUID  0.035INX150CM: Type: IMPLANTABLE DEVICE | Status: FUNCTIONAL

## 2023-12-05 DEVICE — SHEATH INTRODUCER TERUMO PINNACLE CORONARY 8FR X 10CM X 0.038" MINI WIRE: Type: IMPLANTABLE DEVICE | Status: FUNCTIONAL

## 2023-12-05 DEVICE — GWIRE JTIP 1.5MM .035X180CM: Type: IMPLANTABLE DEVICE | Status: FUNCTIONAL

## 2023-12-05 DEVICE — SUT PERCLOSE PROGLIDE 6FR: Type: IMPLANTABLE DEVICE | Status: FUNCTIONAL

## 2023-12-05 DEVICE — CATH DX AL1 INFIN 5FRX100CM: Type: IMPLANTABLE DEVICE | Status: FUNCTIONAL

## 2023-12-05 DEVICE — VLV TRANS CATH W/COMM SYS SAPIEN 3 ULTRA 26MM: Type: IMPLANTABLE DEVICE | Status: FUNCTIONAL

## 2023-12-05 DEVICE — GUIDEWIRE STANDARD STRAIGHT .035" X 180CM: Type: IMPLANTABLE DEVICE | Status: FUNCTIONAL

## 2023-12-05 DEVICE — EMERALD GUIDEWIRE 0.35: Type: IMPLANTABLE DEVICE | Status: FUNCTIONAL

## 2023-12-05 DEVICE — PACING CATH PACEL RIGHT HEART CURVE 5FR KIT: Type: IMPLANTABLE DEVICE | Status: FUNCTIONAL

## 2023-12-05 DEVICE — INTRO MICROPUNC 4FRX10CM SS: Type: IMPLANTABLE DEVICE | Status: FUNCTIONAL

## 2023-12-05 RX ORDER — CEFAZOLIN SODIUM 1 G
2000 VIAL (EA) INJECTION EVERY 8 HOURS
Refills: 0 | Status: COMPLETED | OUTPATIENT
Start: 2023-12-05 | End: 2023-12-06

## 2023-12-05 RX ORDER — ATORVASTATIN CALCIUM 80 MG/1
40 TABLET, FILM COATED ORAL AT BEDTIME
Refills: 0 | Status: DISCONTINUED | OUTPATIENT
Start: 2023-12-05 | End: 2023-12-06

## 2023-12-05 RX ORDER — LEVOTHYROXINE SODIUM 125 MCG
88 TABLET ORAL DAILY
Refills: 0 | Status: DISCONTINUED | OUTPATIENT
Start: 2023-12-06 | End: 2023-12-06

## 2023-12-05 RX ORDER — CLOPIDOGREL BISULFATE 75 MG/1
75 TABLET, FILM COATED ORAL DAILY
Refills: 0 | Status: DISCONTINUED | OUTPATIENT
Start: 2023-12-06 | End: 2023-12-06

## 2023-12-05 RX ORDER — FOLIC ACID 0.8 MG
3 TABLET ORAL
Refills: 0 | DISCHARGE

## 2023-12-05 RX ORDER — POLYETHYLENE GLYCOL 3350 17 G/17G
17 POWDER, FOR SOLUTION ORAL DAILY
Refills: 0 | Status: DISCONTINUED | OUTPATIENT
Start: 2023-12-05 | End: 2023-12-06

## 2023-12-05 RX ORDER — MAGNESIUM SULFATE 500 MG/ML
2 VIAL (ML) INJECTION ONCE
Refills: 0 | Status: COMPLETED | OUTPATIENT
Start: 2023-12-05 | End: 2023-12-05

## 2023-12-05 RX ORDER — PANTOPRAZOLE SODIUM 20 MG/1
40 TABLET, DELAYED RELEASE ORAL DAILY
Refills: 0 | Status: DISCONTINUED | OUTPATIENT
Start: 2023-12-05 | End: 2023-12-06

## 2023-12-05 RX ORDER — FOLIC ACID 0.8 MG
3 TABLET ORAL DAILY
Refills: 0 | Status: DISCONTINUED | OUTPATIENT
Start: 2023-12-05 | End: 2023-12-06

## 2023-12-05 RX ORDER — LEVOTHYROXINE SODIUM 125 MCG
1 TABLET ORAL
Refills: 0 | DISCHARGE

## 2023-12-05 RX ORDER — LANOLIN ALCOHOL/MO/W.PET/CERES
5 CREAM (GRAM) TOPICAL AT BEDTIME
Refills: 0 | Status: DISCONTINUED | OUTPATIENT
Start: 2023-12-05 | End: 2023-12-06

## 2023-12-05 RX ORDER — MODAFINIL 200 MG/1
1 TABLET ORAL
Refills: 0 | DISCHARGE

## 2023-12-05 RX ORDER — METHOTREXATE 2.5 MG/1
6 TABLET ORAL
Refills: 0 | DISCHARGE

## 2023-12-05 RX ORDER — TAMSULOSIN HYDROCHLORIDE 0.4 MG/1
0.4 CAPSULE ORAL AT BEDTIME
Refills: 0 | Status: DISCONTINUED | OUTPATIENT
Start: 2023-12-05 | End: 2023-12-06

## 2023-12-05 RX ORDER — SODIUM CHLORIDE 9 MG/ML
1000 INJECTION INTRAMUSCULAR; INTRAVENOUS; SUBCUTANEOUS
Refills: 0 | Status: DISCONTINUED | OUTPATIENT
Start: 2023-12-05 | End: 2023-12-06

## 2023-12-05 RX ORDER — HEPARIN SODIUM 5000 [USP'U]/ML
5000 INJECTION INTRAVENOUS; SUBCUTANEOUS EVERY 8 HOURS
Refills: 0 | Status: DISCONTINUED | OUTPATIENT
Start: 2023-12-05 | End: 2023-12-06

## 2023-12-05 RX ORDER — ASPIRIN/CALCIUM CARB/MAGNESIUM 324 MG
81 TABLET ORAL DAILY
Refills: 0 | Status: DISCONTINUED | OUTPATIENT
Start: 2023-12-06 | End: 2023-12-06

## 2023-12-05 RX ORDER — ACETAMINOPHEN 500 MG
650 TABLET ORAL EVERY 6 HOURS
Refills: 0 | Status: DISCONTINUED | OUTPATIENT
Start: 2023-12-05 | End: 2023-12-06

## 2023-12-05 RX ADMIN — TAMSULOSIN HYDROCHLORIDE 0.4 MILLIGRAM(S): 0.4 CAPSULE ORAL at 22:16

## 2023-12-05 RX ADMIN — Medication 25 GRAM(S): at 12:39

## 2023-12-05 RX ADMIN — ATORVASTATIN CALCIUM 40 MILLIGRAM(S): 80 TABLET, FILM COATED ORAL at 22:16

## 2023-12-05 RX ADMIN — Medication 100 MILLIGRAM(S): at 17:21

## 2023-12-05 RX ADMIN — Medication 5 MILLIGRAM(S): at 23:35

## 2023-12-05 RX ADMIN — HEPARIN SODIUM 5000 UNIT(S): 5000 INJECTION INTRAVENOUS; SUBCUTANEOUS at 22:16

## 2023-12-05 NOTE — H&P ADULT - NSHPPHYSICALEXAM_GEN_ALL_CORE
General: Sitting in chair NAD  Head: NCAT  EENT:  MMM EOMI neck supple   NEck : Supple   Neurological: A&O x3, no focal deficits, DORSEY  Cardiovascular: RRR  Respiratory: Non-labored breathing on RA   Gastrointestinal:  soft non-distended  Extremities: WWP  Psych: Mood and affect appropriate

## 2023-12-05 NOTE — H&P ADULT - HISTORY OF PRESENT ILLNESS
83 Y male, former smoker, with PHM of psoriatic arthritis (on methotrexate), lumbar spinal stenosis, neuropathy, MICHELINE, hypothyroidism, BPH, HTN, dyslipidemia, CAD s/p PCI/OLESYA of the prox to mid LAD (10/19/23), HFpEF, and symptomatic (incresaed fatigability and GARSIA) severe AS (TTE 9/20/23: LVEF 55%, PV 3.98, MG 41) referred for consultation of surgical treatment options for aortic stenosis. Patient endorsed having known AS for 3 years and have been watchful waiting. He then had his surveillance TTE done in September 2023 that showed progression of his aortic stenosis. In terms of symptoms, he endorsed worsening fatigue over the past 6 months and relative new dyspnea on exertion when walking uphill. He has an exercise tolerance of 1 block limited by his arthritis pain. He denies any recent chest pain, PND, orthopnea, LE edema, palpitations, lightheadedness/dizziness or syncope, or recent heart failure admission. He completed PST and presents today for TAVR

## 2023-12-05 NOTE — H&P ADULT - NSICDXPASTSURGICALHX_GEN_ALL_CORE_FT
PAST SURGICAL HISTORY:  H/O total knee replacement, right     S/P appendectomy     S/P cholecystectomy     S/P hip replacement, left

## 2023-12-05 NOTE — PROGRESS NOTE ADULT - SUBJECTIVE AND OBJECTIVE BOX
Patient discussed on morning rounds with Dr. Meehan    OPERATION & DATE: 12/5/23 TAVR (26 mm Jennifer valve), EF 55%    SUBJECTIVE ASSESSMENT:  Assessed at bedside post TAVR. No acute complaints. Denies pain, fever, chills, nausea, vomiting, SOB.     VITAL SIGNS:  Vital Signs Last 24 Hrs  T(C): 36.9 (05 Dec 2023 17:10), Max: 36.9 (05 Dec 2023 17:10)  T(F): 98.4 (05 Dec 2023 17:10), Max: 98.4 (05 Dec 2023 17:10)  HR: 49 (05 Dec 2023 17:00) (44 - 59)  BP: 144/69 (04 Dec 2023 19:53) (144/69 - 144/69)  BP(mean): --  RR: 17 (05 Dec 2023 18:00) (14 - 18)  SpO2: 100% (05 Dec 2023 17:00) (97% - 100%)    Parameters below as of 05 Dec 2023 18:00  Patient On (Oxygen Delivery Method): room air      I&O's Detail    05 Dec 2023 07:01  -  05 Dec 2023 17:50  --------------------------------------------------------  IN:    IV PiggyBack: 50 mL    sodium chloride 0.9%: 540 mL  Total IN: 590 mL    OUT:    Voided (mL): 65 mL  Total OUT: 65 mL    Total NET: 525 mL    CHEST TUBE:  No  BIBIANA DRAIN:  No  EPICARDIAL WIRES: No   STITCHES: No  STAPLES: No  SANCHEZ:  No  CENTRAL LINE: No  MIDLINE/PICC: No  WOUND VAC: No    Physical Exam  CONSTITUTIONAL: Well appearing in NAD assessed laying comfortably in bed   NEURO: A&OX3. No focal deficits noted, moving bilateral upper and lower extremities                    CV: RRR, no murmurs, rubs, gallops  RESPIRATORY: Clear to auscultation bilateral posterior lung fields, no wheezes, rales, rhonchi   GI: +BS, NT/ND  MUSKULOSKELETAL: No peripheral edema or calf tenderness. Full strength and ROM bilateral upper and lower extremities   VASCULAR: Bilateral distal pulses 2+  INCISIONS: Bilateral groins soft, no hematoma     LABS:                        10.6   6.68  )-----------( 124      ( 05 Dec 2023 10:43 )             31.0     PT/INR - ( 05 Dec 2023 10:43 )   PT: 13.8 sec;   INR: 1.22          PTT - ( 05 Dec 2023 10:43 )  PTT:50.0 sec  12-05    141  |  110<H>  |  21  ----------------------------<  101<H>  4.2   |  24  |  1.25    Ca    9.4      05 Dec 2023 10:43  Mg     1.6     12-05    TPro  5.2<L>  /  Alb  3.4  /  TBili  0.5  /  DBili  x   /  AST  20  /  ALT  16  /  AlkPhos  93  12-05    Urinalysis Basic - ( 05 Dec 2023 10:43 )    Color: x / Appearance: x / SG: x / pH: x  Gluc: 101 mg/dL / Ketone: x  / Bili: x / Urobili: x   Blood: x / Protein: x / Nitrite: x   Leuk Esterase: x / RBC: x / WBC x   Sq Epi: x / Non Sq Epi: x / Bacteria: x      MEDICATIONS  (STANDING):  atorvastatin 40 milliGRAM(s) Oral at bedtime  ceFAZolin   IVPB 2000 milliGRAM(s) IV Intermittent every 8 hours  folic acid 3 milliGRAM(s) Oral daily  heparin   Injectable 5000 Unit(s) SubCutaneous every 8 hours  pantoprazole    Tablet 40 milliGRAM(s) Oral daily  polyethylene glycol 3350 17 Gram(s) Oral daily  sodium chloride 0.9%. 1000 milliLiter(s) (10 mL/Hr) IV Continuous <Continuous>  tamsulosin 0.4 milliGRAM(s) Oral at bedtime    MEDICATIONS  (PRN):  acetaminophen     Tablet .. 650 milliGRAM(s) Oral every 6 hours PRN Mild Pain (1 - 3)    RADIOLOGY & ADDITIONAL TESTS:    tt< from: TTE Echo Complete w/o Doppler (12.05.23 @ 15:15) >  CONCLUSIONS:     1. Technically difficult study.   2. Normal left ventricular systolic function.   3. 26mm Jennifer 3 Ultra TAVR valve is seen in the aortic position with   apparent normal function, without evidence of regurgitation.   4. No pericardial effusion.    < end of copied text >

## 2023-12-05 NOTE — PROGRESS NOTE ADULT - ASSESSMENT
A/P:    83 Y male, former smoker, with PMHx of psoriatic arthritis (on methotrexate), lumbar spinal stenosis, neuropathy, MICHELINE, hypothyroidism, BPH, HTN, dyslipidemia, CAD s/p PCI/OLESYA of the prox to mid LAD (10/19/23), HFpEF, and symptomatic (incresaed fatigability and GARSIA) severe AS (TTE 9/20/23: LVEF 55%, PV 3.98, MG 41) referred for consultation of surgical treatment options for aortic stenosis. He was deemed a good candidate for intervention given worsening symptoms. On 12/5 he underwent an uncomplicated TAVR (26 mm Jennifer valve), EF 55%. Post operatively he was brought to [facility] in stable condition with no TVP in place. TTE completed which revealed Jennifer valve in aortic position with no pericardial effusion.     Neurovascular:   - No delirium. Pain well controlled with current regimen.  -Continue tylenol PRN  - Continue home modafinil 200 mg daily     Cardiovascular:   - POD0 s/p TAVR (Jennifer valve), EF normal  -Hemodynamically stable. HR controlled, QRS narrow but bradycardic post op (40-50), holding BB for now  - Hx of HTN, BP controlled (144/69)  - Hx CAD s/p PCI 10/2023, continue ASA/ plavix  - HLD: continue atorvastatin 40 mg daily     Respiratory:   - 02 Sat = 98% on 2L NC  - If on oxygen wean to RA from for O2 Sat > 93%.  -Encourage C+DB and Use of IS 10x / hr while awake.  -CXR without effusion, consolidation     GI:   - Stable.  -Continue GI PPX with protonix  -PO Diet.    Renal / :  - BUN/Cr stable at 21/1.25  -Continue to monitor renal function.  -Monitor I/O's.  - Replete electrolytes PRN  - BPH: continue flomax     Endocrine:   -A1c 5.4, no known hx DM  -TSH pending, known hx hypothyroidism, continue synthroid 88 mcg daily    Rheum:  - Hx psoriatic arthritis, on methotrexate weekly at home     Hematologic:  -H/H stable at 10.6/31.0 with no obvious signs of bleeding  -Coagulation Panel.  - Continue folic acid supplementation     ID:  -Pt remains afebrile with no elevation in WBC or signs of infection  -Continue to monitor CBC  -Observe for SIRS/Sepsis Syndrome.    Prophylaxis:  -DVT prophylaxis with 5000 SubQ Heparin q8h.  -SCD's    Disposition:  -Home when medically appropriate.   A/P:    83 Y male, former smoker, with PMHx of psoriatic arthritis (on methotrexate), lumbar spinal stenosis, neuropathy, MICHELINE, hypothyroidism, BPH, HTN, dyslipidemia, CAD s/p PCI/OLESYA of the prox to mid LAD (10/19/23), HFpEF, and symptomatic (incresaed fatigability and GARSIA) severe AS (TTE 9/20/23: LVEF 55%, PV 3.98, MG 41) referred for consultation of surgical treatment options for aortic stenosis. He was deemed a good candidate for intervention given worsening symptoms. On 12/5 he underwent an uncomplicated TAVR (26 mm Jennifer valve), EF 55%. Post operatively he was brought to [facility] in stable condition with no TVP in place. TTE completed which revealed Jennifer valve in aortic position with no pericardial effusion.     Neurovascular:   - No delirium. Pain well controlled with current regimen.  -Continue tylenol PRN  - Continue home modafinil 200 mg daily     Cardiovascular:   - POD0 s/p TAVR (Jennifer valve), EF normal  -Hemodynamically stable. HR controlled, QRS narrow but bradycardic post op (40-50), holding BB for now  - Hx of HTN, BP controlled (144/69)  - Hx CAD s/p PCI 10/2023, continue ASA/ plavix  - HLD: continue atorvastatin 40 mg daily     Respiratory:   - 02 Sat = 98% on 2L NC  - If on oxygen wean to RA from for O2 Sat > 93%.  -Encourage C+DB and Use of IS 10x / hr while awake.  -CXR without effusion, consolidation     GI:   - Stable.  -Continue GI PPX with protonix  -PO Diet.    Renal / :  - BUN/Cr stable at 21/1.25  -Continue to monitor renal function.  -Monitor I/O's.  - Replete electrolytes PRN  - BPH: continue flomax     Endocrine:   -A1c 5.4, no known hx DM  -TSH pending, known hx hypothyroidism, continue synthroid 88 mcg daily    Rheum:  - Hx psoriatic arthritis, on methotrexate weekly at home     Hematologic:  -H/H stable at 10.6/31.0 with no obvious signs of bleeding  -Coagulation Panel.  - Continue folic acid supplementation     ID:  -Pt remains afebrile with no elevation in WBC or signs of infection  -Continue to monitor CBC  -Observe for SIRS/Sepsis Syndrome.    Prophylaxis:  -DVT prophylaxis with 5000 SubQ Heparin q8h.  -SCD's    Disposition:  -Home when medically appropriate.

## 2023-12-05 NOTE — H&P ADULT - NSICDXPASTMEDICALHX_GEN_ALL_CORE_FT
PAST MEDICAL HISTORY:  CAD (coronary artery disease)     Dyslipidemia     History of BPH     HTN (hypertension)     Hypothyroidism     Lumbar spinal stenosis     Neuropathy     MICEHLINE (obstructive sleep apnea)     Psoriatic arthritis      PAST MEDICAL HISTORY:  CAD (coronary artery disease)     Dyslipidemia     History of BPH     HTN (hypertension)     Hypothyroidism     Lumbar spinal stenosis     Neuropathy     MICHELINE (obstructive sleep apnea)     Psoriatic arthritis

## 2023-12-05 NOTE — H&P ADULT - ASSESSMENT
83 Y male, former smoker, with PHM of psoriatic arthritis (on methotrexate), lumbar spinal stenosis, neuropathy, MICHELINE, hypothyroidism, BPH, HTN, dyslipidemia, CAD s/p PCI/OLESYA of the prox to mid LAD (10/19/23), HFpEF, and symptomatic (incresaed fatigability and GARSIA) severe AS (TTE 9/20/23: LVEF 55%, PV 3.98, MG 41) who presents today for TAVR.     -Pre-op labs/EKG  -TAVR today  - Admit postop to [facility]

## 2023-12-05 NOTE — H&P ADULT - NSHPLABSRESULTS_GEN_ALL_CORE
CTA Cardiac 11/01/23  IMPRESSION:   Cardiac:   1. Please note this study was not optimized for the evaluation of the coronary arteries.   2. Patent stent in proximal to mid LAD.   3. Remaining segments demonstrate non obstructive coronary artery disease.   4. Calcified trileaflet aortic valve.   5. No evidence of left atrial appendage thrombus.     Non-cardiac:   See separately dictated CTA of the chest, abdomen, and pelvis     CTA Chest, Abdomen and Pelvis 11/1/23  IMPRESSION:   Study performed for purposes of pre-TAVR procedural planning.   Aortoiliac atheromatous disease. Focal luminal thrombus projecting into the lumen of the descending aorta resulting in mild stenosis.   Nodularity and reticular opacities in the right lower lobe may be due to active versus chronic infectious/inflammatory etiology.   Additional chronic/incidental findings as above.     Carotid US 10/31/23  IMPRESSION:   No hemodynamically significant internal carotid artery stenoses.   There is a flow-limiting stenosis of the right external carotid artery.   There may be a proximal stenosis of the right vertebral artery.     Cardiac cath at [facility] 10/19/23  1. Left cath performed via the right femoral vein. Coronary angio, IFR, and PCI performed via the right radial artery.   2. 70% proximal to mid LAD stenosis, IFR positive at 0.85, treated with OLESYA x1 (3.25 x 15 mm Xience Skypoint). No significant disease in the remainder of the vessels. Normal filling pressures and preserved cardiac output and index.    TTE 9/28/23: LVEF >55%, moderate asymmetric septal hypertrophy with septal thickness 1.6-1.9cm, normal RV size and function, mild LAE, severe AS with peak/mean transvalvular gradients of 63 mmHg/41mm Hg and a calculated valve area of 1.1cm2. There is mild AI. There is KIMBERLY of the chordae tendineae of the MV leaflet. Mild MR. The gradient across the LVOT was normal at rest and with Valsalva maneuver. Mild TR. The aortic root is dilated measuring 4cm. Dilated ascending aorta measuring 4cm. Since the previous study there is now AS and aortic dilatation.     Regadenoson Stress Test 2/13/20  SUMMARY.   1. Clinically and electrocardiographically negative response to Lexiscan   2. SPECT scans do not reveal evidence of myocardial scarring or inducible ischemia. Overall left ventricular function is well-preserved. CTA Cardiac 11/01/23  IMPRESSION:   Cardiac:   1. Please note this study was not optimized for the evaluation of the coronary arteries.   2. Patent stent in proximal to mid LAD.   3. Remaining segments demonstrate non obstructive coronary artery disease.   4. Calcified trileaflet aortic valve.   5. No evidence of left atrial appendage thrombus.     Non-cardiac:   See separately dictated CTA of the chest, abdomen, and pelvis     CTA Chest, Abdomen and Pelvis 11/1/23  IMPRESSION:   Study performed for purposes of pre-TAVR procedural planning.   Aortoiliac atheromatous disease. Focal luminal thrombus projecting into the lumen of the descending aorta resulting in mild stenosis.   Nodularity and reticular opacities in the right lower lobe may be due to active versus chronic infectious/inflammatory etiology.   Additional chronic/incidental findings as above.     Carotid US 10/31/23  IMPRESSION:   No hemodynamically significant internal carotid artery stenoses.   There is a flow-limiting stenosis of the right external carotid artery.   There may be a proximal stenosis of the right vertebral artery.     Cardiac cath at [facility] 10/19/23  1. Left cath performed via the right femoral vein. Coronary angio, IFR, and PCI performed via the right radial artery.   2. 70% proximal to mid LAD stenosis, IFR positive at 0.85, treated with OLESYA x1 (3.25 x 15 mm Xience Skypoint). No significant disease in the remainder of the vessels. Normal filling pressures and preserved cardiac output and index.    TTE 9/28/23: LVEF >55%, moderate asymmetric septal hypertrophy with septal thickness 1.6-1.9cm, normal RV size and function, mild LAE, severe AS with peak/mean transvalvular gradients of 63 mmHg/41mm Hg and a calculated valve area of 1.1cm2. There is mild AI. There is KIMBERLY of the chordae tendineae of the MV leaflet. Mild MR. The gradient across the LVOT was normal at rest and with Valsalva maneuver. Mild TR. The aortic root is dilated measuring 4cm. Dilated ascending aorta measuring 4cm. Since the previous study there is now AS and aortic dilatation.     Regadenoson Stress Test 2/13/20  SUMMARY.   1. Clinically and electrocardiographically negative response to Lexiscan   2. SPECT scans do not reveal evidence of myocardial scarring or inducible ischemia. Overall left ventricular function is well-preserved.

## 2023-12-06 ENCOUNTER — TRANSCRIPTION ENCOUNTER (OUTPATIENT)
Age: 83
End: 2023-12-06

## 2023-12-06 ENCOUNTER — NON-APPOINTMENT (OUTPATIENT)
Age: 83
End: 2023-12-06

## 2023-12-06 VITALS
SYSTOLIC BLOOD PRESSURE: 103 MMHG | OXYGEN SATURATION: 97 % | DIASTOLIC BLOOD PRESSURE: 65 MMHG | HEART RATE: 55 BPM | RESPIRATION RATE: 18 BRPM

## 2023-12-06 DIAGNOSIS — I44.1 ATRIOVENTRICULAR BLOCK, SECOND DEGREE: ICD-10-CM

## 2023-12-06 DIAGNOSIS — R00.1 BRADYCARDIA, UNSPECIFIED: ICD-10-CM

## 2023-12-06 LAB
ANION GAP SERPL CALC-SCNC: 9 MMOL/L — SIGNIFICANT CHANGE UP (ref 5–17)
ANION GAP SERPL CALC-SCNC: 9 MMOL/L — SIGNIFICANT CHANGE UP (ref 5–17)
APTT BLD: 31.3 SEC — SIGNIFICANT CHANGE UP (ref 24.5–35.6)
APTT BLD: 31.3 SEC — SIGNIFICANT CHANGE UP (ref 24.5–35.6)
BUN SERPL-MCNC: 24 MG/DL — HIGH (ref 7–23)
BUN SERPL-MCNC: 24 MG/DL — HIGH (ref 7–23)
CALCIUM SERPL-MCNC: 8.7 MG/DL — SIGNIFICANT CHANGE UP (ref 8.4–10.5)
CALCIUM SERPL-MCNC: 8.7 MG/DL — SIGNIFICANT CHANGE UP (ref 8.4–10.5)
CHLORIDE SERPL-SCNC: 108 MMOL/L — SIGNIFICANT CHANGE UP (ref 96–108)
CHLORIDE SERPL-SCNC: 108 MMOL/L — SIGNIFICANT CHANGE UP (ref 96–108)
CO2 SERPL-SCNC: 22 MMOL/L — SIGNIFICANT CHANGE UP (ref 22–31)
CO2 SERPL-SCNC: 22 MMOL/L — SIGNIFICANT CHANGE UP (ref 22–31)
CREAT SERPL-MCNC: 1.41 MG/DL — HIGH (ref 0.5–1.3)
CREAT SERPL-MCNC: 1.41 MG/DL — HIGH (ref 0.5–1.3)
EGFR: 49 ML/MIN/1.73M2 — LOW
EGFR: 49 ML/MIN/1.73M2 — LOW
GLUCOSE SERPL-MCNC: 112 MG/DL — HIGH (ref 70–99)
GLUCOSE SERPL-MCNC: 112 MG/DL — HIGH (ref 70–99)
HCT VFR BLD CALC: 30.3 % — LOW (ref 39–50)
HCT VFR BLD CALC: 30.3 % — LOW (ref 39–50)
HGB BLD-MCNC: 10.4 G/DL — LOW (ref 13–17)
HGB BLD-MCNC: 10.4 G/DL — LOW (ref 13–17)
INR BLD: 1.19 — HIGH (ref 0.85–1.18)
INR BLD: 1.19 — HIGH (ref 0.85–1.18)
MAGNESIUM SERPL-MCNC: 2 MG/DL — SIGNIFICANT CHANGE UP (ref 1.6–2.6)
MAGNESIUM SERPL-MCNC: 2 MG/DL — SIGNIFICANT CHANGE UP (ref 1.6–2.6)
MCHC RBC-ENTMCNC: 33.4 PG — SIGNIFICANT CHANGE UP (ref 27–34)
MCHC RBC-ENTMCNC: 33.4 PG — SIGNIFICANT CHANGE UP (ref 27–34)
MCHC RBC-ENTMCNC: 34.3 GM/DL — SIGNIFICANT CHANGE UP (ref 32–36)
MCHC RBC-ENTMCNC: 34.3 GM/DL — SIGNIFICANT CHANGE UP (ref 32–36)
MCV RBC AUTO: 97.4 FL — SIGNIFICANT CHANGE UP (ref 80–100)
MCV RBC AUTO: 97.4 FL — SIGNIFICANT CHANGE UP (ref 80–100)
NRBC # BLD: 0 /100 WBCS — SIGNIFICANT CHANGE UP (ref 0–0)
NRBC # BLD: 0 /100 WBCS — SIGNIFICANT CHANGE UP (ref 0–0)
PLATELET # BLD AUTO: 108 K/UL — LOW (ref 150–400)
PLATELET # BLD AUTO: 108 K/UL — LOW (ref 150–400)
POTASSIUM SERPL-MCNC: 4.3 MMOL/L — SIGNIFICANT CHANGE UP (ref 3.5–5.3)
POTASSIUM SERPL-MCNC: 4.3 MMOL/L — SIGNIFICANT CHANGE UP (ref 3.5–5.3)
POTASSIUM SERPL-SCNC: 4.3 MMOL/L — SIGNIFICANT CHANGE UP (ref 3.5–5.3)
POTASSIUM SERPL-SCNC: 4.3 MMOL/L — SIGNIFICANT CHANGE UP (ref 3.5–5.3)
PROTHROM AB SERPL-ACNC: 13.5 SEC — HIGH (ref 9.5–13)
PROTHROM AB SERPL-ACNC: 13.5 SEC — HIGH (ref 9.5–13)
RBC # BLD: 3.11 M/UL — LOW (ref 4.2–5.8)
RBC # BLD: 3.11 M/UL — LOW (ref 4.2–5.8)
RBC # FLD: 13.5 % — SIGNIFICANT CHANGE UP (ref 10.3–14.5)
RBC # FLD: 13.5 % — SIGNIFICANT CHANGE UP (ref 10.3–14.5)
SODIUM SERPL-SCNC: 139 MMOL/L — SIGNIFICANT CHANGE UP (ref 135–145)
SODIUM SERPL-SCNC: 139 MMOL/L — SIGNIFICANT CHANGE UP (ref 135–145)
WBC # BLD: 7.45 K/UL — SIGNIFICANT CHANGE UP (ref 3.8–10.5)
WBC # BLD: 7.45 K/UL — SIGNIFICANT CHANGE UP (ref 3.8–10.5)
WBC # FLD AUTO: 7.45 K/UL — SIGNIFICANT CHANGE UP (ref 3.8–10.5)
WBC # FLD AUTO: 7.45 K/UL — SIGNIFICANT CHANGE UP (ref 3.8–10.5)

## 2023-12-06 PROCEDURE — 99223 1ST HOSP IP/OBS HIGH 75: CPT

## 2023-12-06 PROCEDURE — 83735 ASSAY OF MAGNESIUM: CPT

## 2023-12-06 PROCEDURE — 83880 ASSAY OF NATRIURETIC PEPTIDE: CPT

## 2023-12-06 PROCEDURE — 93308 TTE F-UP OR LMTD: CPT

## 2023-12-06 PROCEDURE — 85730 THROMBOPLASTIN TIME PARTIAL: CPT

## 2023-12-06 PROCEDURE — 85025 COMPLETE CBC W/AUTO DIFF WBC: CPT

## 2023-12-06 PROCEDURE — C1887: CPT

## 2023-12-06 PROCEDURE — L8699: CPT

## 2023-12-06 PROCEDURE — 86900 BLOOD TYPING SEROLOGIC ABO: CPT

## 2023-12-06 PROCEDURE — C1769: CPT

## 2023-12-06 PROCEDURE — 86901 BLOOD TYPING SEROLOGIC RH(D): CPT

## 2023-12-06 PROCEDURE — 85027 COMPLETE CBC AUTOMATED: CPT

## 2023-12-06 PROCEDURE — 93010 ELECTROCARDIOGRAM REPORT: CPT

## 2023-12-06 PROCEDURE — 85610 PROTHROMBIN TIME: CPT

## 2023-12-06 PROCEDURE — 71045 X-RAY EXAM CHEST 1 VIEW: CPT

## 2023-12-06 PROCEDURE — 93005 ELECTROCARDIOGRAM TRACING: CPT

## 2023-12-06 PROCEDURE — C1894: CPT

## 2023-12-06 PROCEDURE — 86923 COMPATIBILITY TEST ELECTRIC: CPT

## 2023-12-06 PROCEDURE — 82330 ASSAY OF CALCIUM: CPT

## 2023-12-06 PROCEDURE — C1760: CPT

## 2023-12-06 PROCEDURE — 80048 BASIC METABOLIC PNL TOTAL CA: CPT

## 2023-12-06 PROCEDURE — 84132 ASSAY OF SERUM POTASSIUM: CPT

## 2023-12-06 PROCEDURE — 71045 X-RAY EXAM CHEST 1 VIEW: CPT | Mod: 26

## 2023-12-06 PROCEDURE — 84443 ASSAY THYROID STIM HORMONE: CPT

## 2023-12-06 PROCEDURE — 36415 COLL VENOUS BLD VENIPUNCTURE: CPT

## 2023-12-06 PROCEDURE — 86850 RBC ANTIBODY SCREEN: CPT

## 2023-12-06 PROCEDURE — 84295 ASSAY OF SERUM SODIUM: CPT

## 2023-12-06 PROCEDURE — 80053 COMPREHEN METABOLIC PANEL: CPT

## 2023-12-06 PROCEDURE — 83036 HEMOGLOBIN GLYCOSYLATED A1C: CPT

## 2023-12-06 PROCEDURE — 93307 TTE W/O DOPPLER COMPLETE: CPT

## 2023-12-06 PROCEDURE — C1889: CPT

## 2023-12-06 PROCEDURE — 82803 BLOOD GASES ANY COMBINATION: CPT

## 2023-12-06 RX ORDER — ACETAMINOPHEN 500 MG
2 TABLET ORAL
Qty: 75 | Refills: 0
Start: 2023-12-06

## 2023-12-06 RX ORDER — ASPIRIN/CALCIUM CARB/MAGNESIUM 324 MG
1 TABLET ORAL
Qty: 30 | Refills: 0
Start: 2023-12-06 | End: 2024-01-04

## 2023-12-06 RX ORDER — PANTOPRAZOLE SODIUM 20 MG/1
1 TABLET, DELAYED RELEASE ORAL
Qty: 30 | Refills: 0
Start: 2023-12-06 | End: 2024-01-04

## 2023-12-06 RX ORDER — METOPROLOL TARTRATE 50 MG
1 TABLET ORAL
Refills: 0 | DISCHARGE

## 2023-12-06 RX ORDER — CLOPIDOGREL BISULFATE 75 MG/1
1 TABLET, FILM COATED ORAL
Refills: 0 | DISCHARGE

## 2023-12-06 RX ORDER — ASPIRIN/CALCIUM CARB/MAGNESIUM 324 MG
1 TABLET ORAL
Refills: 0 | DISCHARGE

## 2023-12-06 RX ORDER — CLOPIDOGREL BISULFATE 75 MG/1
1 TABLET, FILM COATED ORAL
Qty: 30 | Refills: 0
Start: 2023-12-06 | End: 2024-01-04

## 2023-12-06 RX ADMIN — CLOPIDOGREL BISULFATE 75 MILLIGRAM(S): 75 TABLET, FILM COATED ORAL at 12:51

## 2023-12-06 RX ADMIN — HEPARIN SODIUM 5000 UNIT(S): 5000 INJECTION INTRAVENOUS; SUBCUTANEOUS at 06:24

## 2023-12-06 RX ADMIN — POLYETHYLENE GLYCOL 3350 17 GRAM(S): 17 POWDER, FOR SOLUTION ORAL at 13:01

## 2023-12-06 RX ADMIN — Medication 3 MILLIGRAM(S): at 12:51

## 2023-12-06 RX ADMIN — Medication 100 MILLIGRAM(S): at 08:29

## 2023-12-06 RX ADMIN — Medication 88 MICROGRAM(S): at 06:23

## 2023-12-06 RX ADMIN — Medication 100 MILLIGRAM(S): at 00:37

## 2023-12-06 RX ADMIN — HEPARIN SODIUM 5000 UNIT(S): 5000 INJECTION INTRAVENOUS; SUBCUTANEOUS at 13:01

## 2023-12-06 RX ADMIN — PANTOPRAZOLE SODIUM 40 MILLIGRAM(S): 20 TABLET, DELAYED RELEASE ORAL at 12:51

## 2023-12-06 RX ADMIN — Medication 81 MILLIGRAM(S): at 12:51

## 2023-12-06 NOTE — DISCHARGE NOTE NURSING/CASE MANAGEMENT/SOCIAL WORK - PATIENT PORTAL LINK FT
You can access the FollowMyHealth Patient Portal offered by [facility] by registering at the following website: [email or web address]. By joining [employer]’s FollowMyHealth portal, you will also be able to view your health information using other applications (apps) compatible with our system. You can access the FollowMyHealth Patient Portal offered by [facility] by registering at the following website: [email or web address]. By joining [employer]’s FollowMyHealth portal, you will also be able to view your health information using other applications (apps) compatible with our system.

## 2023-12-06 NOTE — CONSULT NOTE ADULT - SUBJECTIVE AND OBJECTIVE BOX
Electrophysiology Consult Note:     CHIEF COMPLAINT:  Patient is a 83y old  Male who presents with a chief complaint of tavr (06 Dec 2023 12:33)        HISTORY OF PRESENT ILLNESS:   HPI:  83 Y male, former smoker, with PHM of psoriatic arthritis (on methotrexate), lumbar spinal stenosis, neuropathy, MICHELINE, hypothyroidism, BPH, HTN, dyslipidemia, CAD s/p PCI/OLESYA of the prox to mid LAD (10/19/23), HFpEF, and symptomatic (incresaed fatigability and GARSIA) severe AS (TTE 9/20/23: LVEF 55%, PV 3.98, MG 41) who had ATVR with Jennifer valve 12/5/23  Post procedure patients ECG was significant for new Clarekebach , EPS called to evaluate.         PAST MEDICAL & SURGICAL HISTORY:  Psoriatic arthritis      Lumbar spinal stenosis      Neuropathy      MICHELINE (obstructive sleep apnea)      Hypothyroidism      History of BPH      HTN (hypertension)      Dyslipidemia      CAD (coronary artery disease)      S/P appendectomy      S/P cholecystectomy      S/P hip replacement, left      H/O total knee replacement, right          FAMILY HISTORY:      SOCIAL HISTORY:    [x ] Non-smoker      Allergies    No Known Allergies    Intolerances    	    MEDICATIONS:  MEDICATIONS  (STANDING):  aspirin enteric coated 81 milliGRAM(s) Oral daily  atorvastatin 40 milliGRAM(s) Oral at bedtime  clopidogrel Tablet 75 milliGRAM(s) Oral daily  folic acid 3 milliGRAM(s) Oral daily  heparin   Injectable 5000 Unit(s) SubCutaneous every 8 hours  levothyroxine 88 MICROGram(s) Oral daily  melatonin 5 milliGRAM(s) Oral at bedtime  pantoprazole    Tablet 40 milliGRAM(s) Oral daily  polyethylene glycol 3350 17 Gram(s) Oral daily  sodium chloride 0.9%. 1000 milliLiter(s) (10 mL/Hr) IV Continuous <Continuous>  tamsulosin 0.4 milliGRAM(s) Oral at bedtime    MEDICATIONS  (PRN):  acetaminophen     Tablet .. 650 milliGRAM(s) Oral every 6 hours PRN Mild Pain (1 - 3)        REVIEW OF SYSTEMS:  CONSTITUTIONAL: No fever, weight loss, or fatigue  EYES: No eye pain, visual disturbances, or discharge  ENMT:  No difficulty hearing, tinnitus, vertigo; No sinus or throat pain  NECK: No pain or stiffness  BREASTS: No pain, masses, or nipple discharge  RESPIRATORY: No cough, wheezing, chills or hemoptysis; No Shortness of Breath  CARDIOVASCULAR: No chest pain, palpitations, dizziness, or leg swelling  GASTROINTESTINAL: No abdominal or epigastric pain. No nausea, vomiting, or hematemesis; No diarrhea or constipation.       PHYSICAL EXAM:  Vital Signs Last 24 Hrs  T(C): 37 (06 Dec 2023 13:15), Max: 37.1 (06 Dec 2023 09:38)  T(F): 98.6 (06 Dec 2023 13:15), Max: 98.7 (06 Dec 2023 09:38)  HR: 55 (06 Dec 2023 14:00) (44 - 61)  BP: 103/65 (06 Dec 2023 14:00) (103/65 - 133/60)  BP(mean): 80 (06 Dec 2023 14:00) (72 - 90)  RR: 18 (06 Dec 2023 14:00) (16 - 18)  SpO2: 97% (06 Dec 2023 14:00) (94% - 100%)    Parameters below as of 06 Dec 2023 14:00  Patient On (Oxygen Delivery Method): room air      Daily     Daily     Constitutional: NAD	  HEENT:   PERRL, EOMI	  CVS:  S1 S2, No JVD, No edema  Pulm: Lungs clear to auscultation	  GI:  Soft, Non-tender, + BS	  Ext: No LE edema  Neurologic: A&O x 3        	  LABS:	                         10.4   7.45  )-----------( 108      ( 06 Dec 2023 05:24 )             30.3     12-06    139  |  108  |  24<H>  ----------------------------<  112<H>  4.3   |  22  |  1.41<H>    Ca    8.7      06 Dec 2023 05:24  Mg     2.0     12-06    TPro  5.2<L>  /  Alb  3.4  /  TBili  0.5  /  DBili  x   /  AST  20  /  ALT  16  /  AlkPhos  93  12-05    proBNP:   Lipid Profile:   HgA1c:   TSH: 	      Telemetry: sinus rhythm with  Wenckebach     Echo: < from: TTE Echo Complete w/o Doppler (12.05.23 @ 15:15) >   1. Technically difficult study.   2. Normal left ventricular systolic function.   3. 26mm Jennifer 3 Ultra TAVR valve is seen in the aortic position with   apparent normal function, without evidence of regurgitation.   4. No pericardial effusion.         Electrophysiology Consult Note:     CHIEF COMPLAINT:  Patient is a 83y old  Male who presents with a chief complaint of tavr (06 Dec 2023 12:33)        HISTORY OF PRESENT ILLNESS:   HPI:  83 Y male, former smoker, with PHM of psoriatic arthritis (on methotrexate), lumbar spinal stenosis, neuropathy, MICHELINE, hypothyroidism, BPH, HTN, dyslipidemia, CAD s/p PCI/OLESYA of the prox to mid LAD (10/19/23), HFpEF, and symptomatic (increased fatigability and GARSIA) severe AS (TTE 9/20/23: LVEF 55%, PV 3.98, MG 41) who had ATVR with Jennifer valve 12/5/23  Post procedure patients ECG was significant for new Wenckebach , EPS called to evaluate.     The patient denies syncope, near syncope, LH, dizziness.  He reports a history of slow rhythm prior to this procedure.      PAST MEDICAL & SURGICAL HISTORY:  Psoriatic arthritis      Lumbar spinal stenosis      Neuropathy      MICHELINE (obstructive sleep apnea)      Hypothyroidism      History of BPH      HTN (hypertension)      Dyslipidemia      CAD (coronary artery disease)      S/P appendectomy      S/P cholecystectomy      S/P hip replacement, left      H/O total knee replacement, right      FAMILY HISTORY:      SOCIAL HISTORY:    [x ] Non-smoker      Allergies    No Known Allergies    Intolerances    	    MEDICATIONS:  MEDICATIONS  (STANDING):  aspirin enteric coated 81 milliGRAM(s) Oral daily  atorvastatin 40 milliGRAM(s) Oral at bedtime  clopidogrel Tablet 75 milliGRAM(s) Oral daily  folic acid 3 milliGRAM(s) Oral daily  heparin   Injectable 5000 Unit(s) SubCutaneous every 8 hours  levothyroxine 88 MICROGram(s) Oral daily  melatonin 5 milliGRAM(s) Oral at bedtime  pantoprazole    Tablet 40 milliGRAM(s) Oral daily  polyethylene glycol 3350 17 Gram(s) Oral daily  sodium chloride 0.9%. 1000 milliLiter(s) (10 mL/Hr) IV Continuous <Continuous>  tamsulosin 0.4 milliGRAM(s) Oral at bedtime    MEDICATIONS  (PRN):  acetaminophen     Tablet .. 650 milliGRAM(s) Oral every 6 hours PRN Mild Pain (1 - 3)        REVIEW OF SYSTEMS:  CONSTITUTIONAL: No fever, weight loss, or fatigue  EYES: No eye pain, visual disturbances, or discharge  ENMT:  No difficulty hearing, tinnitus, vertigo; No sinus or throat pain  NECK: No pain or stiffness  BREASTS: No pain, masses, or nipple discharge  RESPIRATORY: No cough, wheezing, chills or hemoptysis; No Shortness of Breath  CARDIOVASCULAR: No chest pain, palpitations, dizziness, or leg swelling  GASTROINTESTINAL: No abdominal or epigastric pain. No nausea, vomiting, or hematemesis; No diarrhea or constipation.       PHYSICAL EXAM:  Vital Signs Last 24 Hrs  T(C): 37 (06 Dec 2023 13:15), Max: 37.1 (06 Dec 2023 09:38)  T(F): 98.6 (06 Dec 2023 13:15), Max: 98.7 (06 Dec 2023 09:38)  HR: 55 (06 Dec 2023 14:00) (44 - 61)  BP: 103/65 (06 Dec 2023 14:00) (103/65 - 133/60)  BP(mean): 80 (06 Dec 2023 14:00) (72 - 90)  RR: 18 (06 Dec 2023 14:00) (16 - 18)  SpO2: 97% (06 Dec 2023 14:00) (94% - 100%)    Parameters below as of 06 Dec 2023 14:00  Patient On (Oxygen Delivery Method): room air      Daily     Daily     Constitutional: NAD	  HEENT:   PERRL, EOMI	  CVS:  S1 S2, No JVD, No edema  Pulm: Lungs clear to auscultation	  GI:  Soft, Non-tender, + BS	  Ext: No LE edema  Neurologic: A&O x 3        	  LABS:	                         10.4   7.45  )-----------( 108      ( 06 Dec 2023 05:24 )             30.3     12-06    139  |  108  |  24<H>  ----------------------------<  112<H>  4.3   |  22  |  1.41<H>    Ca    8.7      06 Dec 2023 05:24  Mg     2.0     12-06    TPro  5.2<L>  /  Alb  3.4  /  TBili  0.5  /  DBili  x   /  AST  20  /  ALT  16  /  AlkPhos  93  12-05    proBNP:   Lipid Profile:   HgA1c:   TSH: 	      Telemetry: sinus rhythm with  Wenckebach     Echo: < from: TTE Echo Complete w/o Doppler (12.05.23 @ 15:15) >   1. Technically difficult study.   2. Normal left ventricular systolic function.   3. 26mm Jennifer 3 Ultra TAVR valve is seen in the aortic position with   apparent normal function, without evidence of regurgitation.   4. No pericardial effusion.

## 2023-12-06 NOTE — DISCHARGE NOTE NURSING/CASE MANAGEMENT/SOCIAL WORK - NSDCFUADDAPPT_GEN_ALL_CORE_FT
Please call our office at [phone] tomorrow to confirm the times and dates of your appointment.  Please call our office at [phone] tomorrow to confirm the times and dates of your appointment.

## 2023-12-06 NOTE — DISCHARGE NOTE NURSING/CASE MANAGEMENT/SOCIAL WORK - NSDCPEFALRISK_GEN_ALL_CORE
For information on Fall & Injury Prevention, visit: [email or web address] OR  [email or web address] OR  https://www.cdc.gov/steadi/patient.html For information on Fall & Injury Prevention, visit: [email or web address] OR  [email or web address] OR  https://www.cdc.gov/steadi/patient.html

## 2023-12-06 NOTE — CONSULT NOTE ADULT - ASSESSMENT
83 Y male, former smoker, with PHM of psoriatic arthritis (on methotrexate), lumbar spinal stenosis, neuropathy, MICHELINE, hypothyroidism, BPH, HTN, dyslipidemia, CAD s/p PCI/OLESYA of the prox to mid LAD (10/19/23), HFpEF, and symptomatic (incresaed fatigability and GARSIA) severe AS (TTE 9/20/23: LVEF 55%, PV 3.98, MG 41) who had ATVR with Jennifer valve 12/5/23  Post procedure patients ECG was significant for new Wenckebach , TVP removed, patient is asymptomatic, continue to monitor, discharge home with out patient cardiac telemetry.  83 Y male, former smoker, with PHM of psoriatic arthritis (on methotrexate), lumbar spinal stenosis, neuropathy, MICHELINE, hypothyroidism, BPH, HTN, dyslipidemia, CAD s/p PCI/OLESYA of the prox to mid LAD (10/19/23), HFpEF, and symptomatic (increased fatigability and GARSIA) severe AS (TTE 9/20/23: LVEF 55%, PV 3.98, MG 41) who had ATVR with Jennifer valve 12/5/23  Post procedure patients ECG was significant for new Wenckebach , TVP removed, patient is asymptomatic, continue to monitor, discharge home with out patient cardiac telemetry.

## 2023-12-06 NOTE — DISCHARGE NOTE PROVIDER - NSDCFUADDAPPT_GEN_ALL_CORE_FT
Please call our office at [phone] tomorrow to confirm the times and dates of your appointment.  Please call our office at [phone] tomorrow to confirm the times and dates of your appointment.

## 2023-12-06 NOTE — DISCHARGE NOTE PROVIDER - HOSPITAL COURSE
Patient discussed on morning rounds with Dr. Meehan    Operation Date: TAVR 12/5 23 dayton    Primary Surgeon/Attending MD: Shade    Referring Physician: none  _ _ _ _ _ _ _ _ _ _ _ _  HOSPITAL COURSE:    83 Y male, former smoker, with PMHx of psoriatic arthritis (on methotrexate), lumbar spinal stenosis, neuropathy, MICHELINE, hypothyroidism, BPH, HTN, dyslipidemia, CAD s/p PCI/OLESYA of the prox to mid LAD (10/19/23), HFpEF, and symptomatic (incresaed fatigability and GARSIA) severe AS (TTE 9/20/23: LVEF 55%, PV 3.98, MG 41) referred for consultation of surgical treatment options for aortic stenosis. He was deemed a good candidate for intervention given worsening symptoms. On 12/5 he underwent an uncomplicated TAVR (26 mm Dayton valve), EF 55%. Post operatively he was brought to [facility] in stable condition with no TVP in place. TTE completed which revealed Dayton valve in aortic position with no pericardial effusion. POD 2 pt in intermittent weinkebach rhythm overnight into today, seen by EP who states he has been in weinkebach pre op and no indication for intervention at this time. Will DC home with OT. Cleared by Dr. Meehan today for discharge, will go home with wife.     _ _ _ _ _ _ _ _ _ _ _ _  DISCHARGE PHYSICAL EXAM:    General: resting comfortably in bed in NAD  Neurological: AOx3. Motor skills grossly intact  Cardiovascular: Normal S1/S2. Regular rate/rhythm. No murmurs  Respiratory: Lungs CTA bilaterally. No wheezing or rales  Gastrointestinal: +BS in all 4 quadrants. Non-distended. Soft. Non-tender  Extremities: Strength 5/5 b/l upper/lower extremities. Sensation grossly intact upper/lower extremities. No edema. No calf tenderness.  Vascular: Radial 2+bilaterally, DP 2+ b/l  Incision Sites: Groins soft bilaterally. L sided echymosis but no hematoma.   _ _ _ _   _ _ _ _ _ _ _ _  REMOVAL CHECKLIST:        [ x] Epicardial wires          [ x] Stitches/tie downs,   If no, why? ______          [ x] PICC/Midline,   If no, why? ________  _ _ _ _ _ _ _ _ _ _ _ _   MEDICATION DISCHARGE CHECKLIST        TAVR Valve        [ x] Aspirin, [  ] Contraindicated, Reason_______________________________        [ x] Plavix, [ ] Contraindicated, Reason _______________________________          _ _ _ _ _ _ _ _ _ _ _ _  RELEVANT LABS/IMAGING:    < from: TTE Echo Complete w/o Doppler (12.05.23 @ 15:15) >    CONCLUSIONS:     1. Technically difficult study.   2. Normal left ventricular systolic function.   3. 26mm Dayton 3 Ultra TAVR valve is seen in the aortic position with   apparent normal function, without evidence of regurgitation.   4. No pericardial effusion.    < end of copied text >           [ x] Home equipment           Type: MCOT           Specific needs:           Outpatient team aware: YES  _ _ _ _ _ _ _ _ _ _ _ _  Over 35 minutes was spent with the patient reviewing the discharge material including medications, follow up appointments, recovery, concerning symptoms, and how to contact their health care providers if they have questions   Patient discussed on morning rounds with Dr. Meehan    Operation Date: TAVR 12/5 23 dayton    Primary Surgeon/Attending MD: Shade    Referring Physician: none  _ _ _ _ _ _ _ _ _ _ _ _  HOSPITAL COURSE:    83 Y male, former smoker, with PMHx of psoriatic arthritis (on methotrexate), lumbar spinal stenosis, neuropathy, MICHELINE, hypothyroidism, BPH, HTN, dyslipidemia, CAD s/p PCI/OLESYA of the prox to mid LAD (10/19/23), HFpEF, and symptomatic (incresaed fatigability and GARSIA) severe AS (TTE 9/20/23: LVEF 55%, PV 3.98, MG 41) referred for consultation of surgical treatment options for aortic stenosis. He was deemed a good candidate for intervention given worsening symptoms. On 12/5 he underwent an uncomplicated TAVR (26 mm Dayton valve), EF 55%. Post operatively he was brought to [facility] in stable condition with no TVP in place. TTE completed which revealed Dayton valve in aortic position with no pericardial effusion. POD 2 pt in intermittent weinkebach rhythm overnight into today, seen by EP who states he has been in weinkebach pre op and no indication for intervention at this time. Will DC home with OT. Cleared by Dr. Meehan today for discharge, will go home with wife.     _ _ _ _ _ _ _ _ _ _ _ _  DISCHARGE PHYSICAL EXAM:    General: resting comfortably in bed in NAD  Neurological: AOx3. Motor skills grossly intact  Cardiovascular: Normal S1/S2. Regular rate/rhythm. No murmurs  Respiratory: Lungs CTA bilaterally. No wheezing or rales  Gastrointestinal: +BS in all 4 quadrants. Non-distended. Soft. Non-tender  Extremities: Strength 5/5 b/l upper/lower extremities. Sensation grossly intact upper/lower extremities. No edema. No calf tenderness.  Vascular: Radial 2+bilaterally, DP 2+ b/l  Incision Sites: Groins soft bilaterally. L sided echymosis but no hematoma.   _ _ _ _   _ _ _ _ _ _ _ _  REMOVAL CHECKLIST:        [ x] Epicardial wires          [ x] Stitches/tie downs,   If no, why? ______          [ x] PICC/Midline,   If no, why? ________  _ _ _ _ _ _ _ _ _ _ _ _   MEDICATION DISCHARGE CHECKLIST        TAVR Valve        [ x] Aspirin, [  ] Contraindicated, Reason_______________________________        [ x] Plavix, [ ] Contraindicated, Reason _______________________________          _ _ _ _ _ _ _ _ _ _ _ _  RELEVANT LABS/IMAGING:    < from: TTE Echo Complete w/o Doppler (12.05.23 @ 15:15) >    CONCLUSIONS:     1. Technically difficult study.   2. Normal left ventricular systolic function.   3. 26mm Dayton 3 Ultra TAVR valve is seen in the aortic position with   apparent normal function, without evidence of regurgitation.   4. No pericardial effusion.    < end of copied text >           [ x] Home equipment           Type: MCOT           Specific needs:           Outpatient team aware: YES  _ _ _ _ _ _ _ _ _ _ _ _  Over 35 minutes was spent with the patient reviewing the discharge material including medications, follow up appointments, recovery, concerning symptoms, and how to contact their health care providers if they have questions

## 2023-12-06 NOTE — DISCHARGE NOTE PROVIDER - NSDCFUADDINST_GEN_ALL_CORE_FT
You had a MCOT monitor (an external cardiac rhythm monitoring device) placed on your day of discharge.  This helps us monitor your heart while you are out of the hospital for 30 days after discharge. Should your heart go into an abnormal or dangerous rhythm you will receieve a call from the MCOT team and your Structural Heart team of Doctors and PA's will be notified.    1. Keep the monitor within 30 feet of you at all times.  2. When you feel any symptom (chest pain, dizziness, palpitations, weakness, fatigue or anything outside of your normal), press the “Record Symptoms” button on the main phone of your phone  3. Shower or exercise as normal whilewearing the MCOT Patch. Do not swim or take a bath. Patch is water-resistant, not waterproof  4. When the battery is low on the phone or on the device, use the supplied . The monitor will show a warning message when the battery is low.  5. Do not remove the patch from yourskin after you begin monitoring. With normal wear, each patch should last 5 days. To replace the patch follow instructions in the MCOT box with the Patch Guide  6. Any issues with the MCOT device or phone please call Customer Service at [phone].  7. If you have any other questions at all please call the Structural Heart office at [phone]    -Walk daily as tolerated and use your incentive spirometer 10 times every hour while you are awake.     -Please weigh yourself daily. If you notice over a 3 pound weight gain in 3 days, this is a sign you are likely retaining too much fluid. It is imperative you call our right away with unexplained rapid weight gain.      -Please continue to wear the compression stockings given to you in the hospital at home. This is a way to prevent fluid from building up in your legs.     -No driving or strenuous activity/exercise until cleared by your surgeon.    -Gently clean your incisions with unscented/antibacterial soap and water, pat dry.  You may leave them open to air.    -Call your doctor if you have shortness of breath, chest pain not relieved by pain medication, dizziness, fever >101.5, or increased redness or drainage from incisions.   You had a MCOT monitor (an external cardiac rhythm monitoring device) placed on your day of discharge.  This helps us monitor your heart while you are out of the hospital for 30 days after discharge. Should your heart go into an abnormal or dangerous rhythm you will receieve a call from the MCOT team and your Structural Heart team of Doctors and PA's will be notified.    1. Keep the monitor within 30 feet of you at all times.  2. When you feel any symptom (chest pain, dizziness, palpitations, weakness, fatigue or anything outside of your normal), press the “Record Symptoms” button on the main phone of your phone  3. Shower or exercise as normal whilewearing the MCOT Patch. Do not swim or take a bath. Patch is water-resistant, not waterproof  4. When the battery is low on the phone or on the device, use the supplied . The monitor will show a warning message when the battery is low.  5. Do not remove the patch from yourskin after you begin monitoring. With normal wear, each patch should last 5 days. To replace the patch follow instructions in the MCOT box with the Patch Guide  6. Any issues with the MCOT device or phone please call Customer Service at [phone].  7. If you have any other questions at all please call the Structural Heart office at [phone]    -Walk daily as tolerated and use your incentive spirometer 10 times every hour while you are awake.     -Please weigh yourself daily. If you notice over a 3 pound weight gain in 3 days, this is a sign you are likely retaining too much fluid. It is imperative you call our right away with unexplained rapid weight gain.      -Please continue to wear the compression stockings given to you in the hospital at home. This is a way to prevent fluid from building up in your legs.     -No driving or strenuous activity/exercise until cleared by your surgeon.    -Gently clean your incisions with unscented/antibacterial soap and water, pat dry.  You may leave them open to air.    -Call your doctor if you have shortness of breath, chest pain not relieved by pain medication, dizziness, fever >101.5, or increased redness or drainage from incisions.

## 2023-12-06 NOTE — DISCHARGE NOTE PROVIDER - PROVIDER TOKENS
PROVIDER:[TOKEN:[[id number]],FOLLOWUP:[1 week]] PROVIDER:[TOKEN:[[id number]],FOLLOWUP:[1 week]]

## 2023-12-06 NOTE — DISCHARGE NOTE PROVIDER - NSDCMRMEDTOKEN_GEN_ALL_CORE_FT
acetaminophen 325 mg oral tablet: 2 tab(s) orally every 6 hours as needed for Mild Pain (1 - 3)  aspirin 81 mg oral delayed release tablet: 1 tab(s) orally once a day  atorvastatin 40 mg oral tablet: 1 tab(s) orally once a day  clopidogrel 75 mg oral tablet: 1 tab(s) orally once a day  folic acid 1 mg oral tablet: 3 tab(s) orally once a day  levothyroxine 88 mcg (0.088 mg) oral tablet: 1 tab(s) orally once a day  methotrexate 2.5 mg oral tablet: 6 tab(s) orally once a week  modafinil 200 mg oral tablet: 1 tab(s) orally prn  pantoprazole 40 mg oral delayed release tablet: 1 tab(s) orally once a day  tamsulosin 0.4 mg oral capsule: 1 cap(s) orally once a day

## 2023-12-06 NOTE — DISCHARGE NOTE PROVIDER - CARE PROVIDER_API CALL
Niranjan Laguerre  Cardiovascular Disease  [address] [place] [address]  Phone: [phone]  Fax: [phone]  Follow Up Time: 1 week   Niranjan Laguerre  Cardiovascular Disease  [address] [place] [address]  Phone: [phone]  Fax: [phone]  Follow Up Time: 1 week

## 2023-12-06 NOTE — CONSULT NOTE ADULT - NS ATTEND AMEND GEN_ALL_CORE FT
I agree with the H and P and assessment as above.  Review of pre procedure ECGs also shows similar rhythm disturbance.  Upon hand , heart rate increases to the 60's with one to one conduction.  Agree with TVP removal and MCOT upon discharge to home.  No pacemaker needed at this time as the rhythm has not changed from pre procedure.

## 2023-12-06 NOTE — DISCHARGE NOTE PROVIDER - NSDCCPTREATMENT_GEN_ALL_CORE_FT
PRINCIPAL PROCEDURE  Procedure: TAVR, percutaneous  Findings and Treatment: 36mm dayton valve, EF 55%

## 2023-12-07 ENCOUNTER — NON-APPOINTMENT (OUTPATIENT)
Age: 83
End: 2023-12-07

## 2023-12-07 ENCOUNTER — APPOINTMENT (OUTPATIENT)
Dept: CARE COORDINATION | Facility: HOME HEALTH | Age: 83
End: 2023-12-07

## 2023-12-07 VITALS
DIASTOLIC BLOOD PRESSURE: 60 MMHG | HEART RATE: 68 BPM | SYSTOLIC BLOOD PRESSURE: 122 MMHG | OXYGEN SATURATION: 97 % | RESPIRATION RATE: 16 BRPM

## 2023-12-07 DIAGNOSIS — Z95.2 PRESENCE OF PROSTHETIC HEART VALVE: ICD-10-CM

## 2023-12-07 DIAGNOSIS — Z09 ENCOUNTER FOR FOLLOW-UP EXAMINATION AFTER COMPLETED TREATMENT FOR CONDITIONS OTHER THAN MALIGNANT NEOPLASM: ICD-10-CM

## 2023-12-07 PROBLEM — I10 ESSENTIAL (PRIMARY) HYPERTENSION: Chronic | Status: ACTIVE | Noted: 2023-12-04

## 2023-12-07 PROBLEM — G47.33 OBSTRUCTIVE SLEEP APNEA (ADULT) (PEDIATRIC): Chronic | Status: ACTIVE | Noted: 2023-12-04

## 2023-12-07 PROBLEM — E78.5 HYPERLIPIDEMIA, UNSPECIFIED: Chronic | Status: ACTIVE | Noted: 2023-12-04

## 2023-12-07 RX ORDER — FOLIC ACID 1 MG/1
1 TABLET ORAL DAILY
Refills: 0 | Status: ACTIVE | COMMUNITY

## 2023-12-07 RX ORDER — METOPROLOL SUCCINATE 50 MG/1
50 TABLET, EXTENDED RELEASE ORAL DAILY
Refills: 0 | Status: COMPLETED | COMMUNITY
End: 2023-12-07

## 2023-12-07 RX ORDER — POLYETHYLENE GLYCOL 3350 17 G/17G
17 POWDER, FOR SOLUTION ORAL DAILY
Qty: 1 | Refills: 0 | Status: ACTIVE | COMMUNITY
Start: 2023-12-07

## 2023-12-07 RX ORDER — PANTOPRAZOLE 40 MG/1
40 TABLET, DELAYED RELEASE ORAL
Qty: 30 | Refills: 0 | Status: ACTIVE | COMMUNITY
Start: 2023-12-07

## 2023-12-19 DIAGNOSIS — G62.9 POLYNEUROPATHY, UNSPECIFIED: ICD-10-CM

## 2023-12-19 DIAGNOSIS — E03.9 HYPOTHYROIDISM, UNSPECIFIED: ICD-10-CM

## 2023-12-19 DIAGNOSIS — Z87.891 PERSONAL HISTORY OF NICOTINE DEPENDENCE: ICD-10-CM

## 2023-12-19 DIAGNOSIS — G47.33 OBSTRUCTIVE SLEEP APNEA (ADULT) (PEDIATRIC): ICD-10-CM

## 2023-12-19 DIAGNOSIS — I50.32 CHRONIC DIASTOLIC (CONGESTIVE) HEART FAILURE: ICD-10-CM

## 2023-12-19 DIAGNOSIS — Z95.5 PRESENCE OF CORONARY ANGIOPLASTY IMPLANT AND GRAFT: ICD-10-CM

## 2023-12-19 DIAGNOSIS — I35.0 NONRHEUMATIC AORTIC (VALVE) STENOSIS: ICD-10-CM

## 2023-12-19 DIAGNOSIS — Z79.631 LONG TERM (CURRENT) USE OF ANTIMETABOLITE AGENT: ICD-10-CM

## 2023-12-19 DIAGNOSIS — E78.5 HYPERLIPIDEMIA, UNSPECIFIED: ICD-10-CM

## 2023-12-19 DIAGNOSIS — I11.0 HYPERTENSIVE HEART DISEASE WITH HEART FAILURE: ICD-10-CM

## 2023-12-19 DIAGNOSIS — M48.061 SPINAL STENOSIS, LUMBAR REGION WITHOUT NEUROGENIC CLAUDICATION: ICD-10-CM

## 2023-12-19 DIAGNOSIS — L40.50 ARTHROPATHIC PSORIASIS, UNSPECIFIED: ICD-10-CM

## 2023-12-19 DIAGNOSIS — I25.10 ATHEROSCLEROTIC HEART DISEASE OF NATIVE CORONARY ARTERY WITHOUT ANGINA PECTORIS: ICD-10-CM

## 2023-12-19 DIAGNOSIS — Z00.6 ENCOUNTER FOR EXAMINATION FOR NORMAL COMPARISON AND CONTROL IN CLINICAL RESEARCH PROGRAM: ICD-10-CM

## 2023-12-19 DIAGNOSIS — Z79.82 LONG TERM (CURRENT) USE OF ASPIRIN: ICD-10-CM

## 2023-12-19 DIAGNOSIS — Z79.02 LONG TERM (CURRENT) USE OF ANTITHROMBOTICS/ANTIPLATELETS: ICD-10-CM

## 2023-12-19 DIAGNOSIS — I44.1 ATRIOVENTRICULAR BLOCK, SECOND DEGREE: ICD-10-CM

## 2023-12-19 DIAGNOSIS — N40.0 BENIGN PROSTATIC HYPERPLASIA WITHOUT LOWER URINARY TRACT SYMPTOMS: ICD-10-CM

## 2023-12-21 PROBLEM — E03.9 HYPOTHYROIDISM, UNSPECIFIED: Chronic | Status: ACTIVE | Noted: 2023-12-04

## 2023-12-21 PROBLEM — I25.10 ATHEROSCLEROTIC HEART DISEASE OF NATIVE CORONARY ARTERY WITHOUT ANGINA PECTORIS: Chronic | Status: ACTIVE | Noted: 2023-12-04

## 2023-12-21 PROBLEM — M48.061 SPINAL STENOSIS, LUMBAR REGION WITHOUT NEUROGENIC CLAUDICATION: Chronic | Status: ACTIVE | Noted: 2023-12-04

## 2024-01-05 ENCOUNTER — TRANSCRIPTION ENCOUNTER (OUTPATIENT)
Age: 84
End: 2024-01-05

## 2024-02-05 PROBLEM — G62.9 POLYNEUROPATHY, UNSPECIFIED: Chronic | Status: ACTIVE | Noted: 2023-12-04

## 2024-02-05 PROBLEM — Z87.438 PERSONAL HISTORY OF OTHER DISEASES OF MALE GENITAL ORGANS: Chronic | Status: ACTIVE | Noted: 2023-12-04

## 2024-02-05 PROBLEM — L40.50 ARTHROPATHIC PSORIASIS, UNSPECIFIED: Chronic | Status: ACTIVE | Noted: 2023-12-04

## 2024-08-23 ENCOUNTER — RESULT REVIEW (OUTPATIENT)
Age: 84
End: 2024-08-23

## 2024-09-13 ENCOUNTER — RESULT REVIEW (OUTPATIENT)
Age: 84
End: 2024-09-13

## 2024-09-14 ENCOUNTER — RESULT REVIEW (OUTPATIENT)
Age: 84
End: 2024-09-14

## 2024-11-19 ENCOUNTER — INPATIENT (INPATIENT)
Facility: HOSPITAL | Age: 84
LOS: 0 days | Discharge: HOME | End: 2024-11-19
Attending: STUDENT IN AN ORGANIZED HEALTH CARE EDUCATION/TRAINING PROGRAM | Admitting: STUDENT IN AN ORGANIZED HEALTH CARE EDUCATION/TRAINING PROGRAM
Payer: MEDICARE

## 2024-11-19 VITALS
DIASTOLIC BLOOD PRESSURE: 75 MMHG | OXYGEN SATURATION: 94 % | TEMPERATURE: 97 F | WEIGHT: 167.99 LBS | RESPIRATION RATE: 18 BRPM | SYSTOLIC BLOOD PRESSURE: 115 MMHG | HEART RATE: 90 BPM | HEIGHT: 70 IN

## 2024-11-19 VITALS
OXYGEN SATURATION: 97 % | HEART RATE: 70 BPM | RESPIRATION RATE: 16 BRPM | DIASTOLIC BLOOD PRESSURE: 67 MMHG | TEMPERATURE: 98 F | SYSTOLIC BLOOD PRESSURE: 114 MMHG

## 2024-11-19 DIAGNOSIS — Z90.49 ACQUIRED ABSENCE OF OTHER SPECIFIED PARTS OF DIGESTIVE TRACT: Chronic | ICD-10-CM

## 2024-11-19 DIAGNOSIS — Z96.651 PRESENCE OF RIGHT ARTIFICIAL KNEE JOINT: Chronic | ICD-10-CM

## 2024-11-19 DIAGNOSIS — Z96.642 PRESENCE OF LEFT ARTIFICIAL HIP JOINT: Chronic | ICD-10-CM

## 2024-11-19 LAB
ALBUMIN SERPL ELPH-MCNC: 4 G/DL — SIGNIFICANT CHANGE UP (ref 3.3–5)
ALP SERPL-CCNC: 125 U/L — HIGH (ref 40–120)
ALT FLD-CCNC: 15 U/L — SIGNIFICANT CHANGE UP (ref 10–45)
ANION GAP SERPL CALC-SCNC: 10 MMOL/L — SIGNIFICANT CHANGE UP (ref 5–17)
APPEARANCE UR: ABNORMAL
APTT BLD: 35.9 SEC — HIGH (ref 24.5–35.6)
AST SERPL-CCNC: 20 U/L — SIGNIFICANT CHANGE UP (ref 10–40)
BASOPHILS # BLD AUTO: 0.06 K/UL — SIGNIFICANT CHANGE UP (ref 0–0.2)
BASOPHILS NFR BLD AUTO: 0.7 % — SIGNIFICANT CHANGE UP (ref 0–2)
BILIRUB SERPL-MCNC: 1.7 MG/DL — HIGH (ref 0.2–1.2)
BILIRUB UR-MCNC: ABNORMAL
BLD GP AB SCN SERPL QL: NEGATIVE — SIGNIFICANT CHANGE UP
BUN SERPL-MCNC: 24 MG/DL — HIGH (ref 7–23)
CALCIUM SERPL-MCNC: 8.9 MG/DL — SIGNIFICANT CHANGE UP (ref 8.4–10.5)
CHLORIDE SERPL-SCNC: 107 MMOL/L — SIGNIFICANT CHANGE UP (ref 96–108)
CO2 SERPL-SCNC: 24 MMOL/L — SIGNIFICANT CHANGE UP (ref 22–31)
COLOR SPEC: ABNORMAL
CREAT SERPL-MCNC: 1.51 MG/DL — HIGH (ref 0.5–1.3)
DIFF PNL FLD: ABNORMAL
EGFR: 45 ML/MIN/1.73M2 — LOW
EOSINOPHIL # BLD AUTO: 0.19 K/UL — SIGNIFICANT CHANGE UP (ref 0–0.5)
EOSINOPHIL NFR BLD AUTO: 2.2 % — SIGNIFICANT CHANGE UP (ref 0–6)
GLUCOSE SERPL-MCNC: 113 MG/DL — HIGH (ref 70–99)
GLUCOSE UR QL: NEGATIVE MG/DL — SIGNIFICANT CHANGE UP
HCT VFR BLD CALC: 32.8 % — LOW (ref 39–50)
HGB BLD-MCNC: 10.9 G/DL — LOW (ref 13–17)
IMM GRANULOCYTES NFR BLD AUTO: 0.2 % — SIGNIFICANT CHANGE UP (ref 0–0.9)
INR BLD: 2.72 — HIGH (ref 0.85–1.16)
KETONES UR-MCNC: NEGATIVE MG/DL — SIGNIFICANT CHANGE UP
LEUKOCYTE ESTERASE UR-ACNC: ABNORMAL
LYMPHOCYTES # BLD AUTO: 0.85 K/UL — LOW (ref 1–3.3)
LYMPHOCYTES # BLD AUTO: 9.8 % — LOW (ref 13–44)
MCHC RBC-ENTMCNC: 33.2 G/DL — SIGNIFICANT CHANGE UP (ref 32–36)
MCHC RBC-ENTMCNC: 33.9 PG — SIGNIFICANT CHANGE UP (ref 27–34)
MCV RBC AUTO: 101.9 FL — HIGH (ref 80–100)
MONOCYTES # BLD AUTO: 0.61 K/UL — SIGNIFICANT CHANGE UP (ref 0–0.9)
MONOCYTES NFR BLD AUTO: 7 % — SIGNIFICANT CHANGE UP (ref 2–14)
NEUTROPHILS # BLD AUTO: 6.93 K/UL — SIGNIFICANT CHANGE UP (ref 1.8–7.4)
NEUTROPHILS NFR BLD AUTO: 80.1 % — HIGH (ref 43–77)
NITRITE UR-MCNC: NEGATIVE — SIGNIFICANT CHANGE UP
NRBC # BLD: 0 /100 WBCS — SIGNIFICANT CHANGE UP (ref 0–0)
PH UR: 5.5 — SIGNIFICANT CHANGE UP (ref 5–8)
PLATELET # BLD AUTO: 124 K/UL — LOW (ref 150–400)
POTASSIUM SERPL-MCNC: 4.6 MMOL/L — SIGNIFICANT CHANGE UP (ref 3.5–5.3)
POTASSIUM SERPL-SCNC: 4.6 MMOL/L — SIGNIFICANT CHANGE UP (ref 3.5–5.3)
PROT SERPL-MCNC: 6.1 G/DL — SIGNIFICANT CHANGE UP (ref 6–8.3)
PROT UR-MCNC: 100 MG/DL
PROTHROM AB SERPL-ACNC: 31 SEC — HIGH (ref 9.9–13.4)
RBC # BLD: 3.22 M/UL — LOW (ref 4.2–5.8)
RBC # FLD: 15.9 % — HIGH (ref 10.3–14.5)
RH IG SCN BLD-IMP: POSITIVE — SIGNIFICANT CHANGE UP
SODIUM SERPL-SCNC: 141 MMOL/L — SIGNIFICANT CHANGE UP (ref 135–145)
SP GR SPEC: 1.02 — SIGNIFICANT CHANGE UP (ref 1–1.03)
TROPONIN T, HIGH SENSITIVITY RESULT: 55 NG/L — CRITICAL HIGH (ref 0–51)
UROBILINOGEN FLD QL: 1 MG/DL — SIGNIFICANT CHANGE UP (ref 0.2–1)
WBC # BLD: 8.66 K/UL — SIGNIFICANT CHANGE UP (ref 3.8–10.5)
WBC # FLD AUTO: 8.66 K/UL — SIGNIFICANT CHANGE UP (ref 3.8–10.5)

## 2024-11-19 PROCEDURE — 99285 EMERGENCY DEPT VISIT HI MDM: CPT

## 2024-11-19 NOTE — ED PROVIDER NOTE - PHYSICAL EXAMINATION
Gen - NAD; well-appearing, comfortably respirating; A+Ox3   HEENT - NCAT, EOMI, moist mucous membranes, clear oropharynx  Neck - supple  Resp - CTAB, no increased WOB  CV -  RRR, no m/r/g  Abd - soft, NT, ND; no guarding or rebound  Back - no midline, paraspinous, or CVA tenderness  MSK - FROM of b/l UE and LE, no gross deformities  Extrem - no LE edema  Neuro - no focal motor or sensation deficits  Skin - warm, well perfused; small superficial abrasion to L anterior distal shin, hemostatic

## 2024-11-19 NOTE — H&P ADULT - ASSESSMENT
83 y/o M, former smoker, with history of BPH,  psoriatic arthritis (on methotrexate), lumbar spinal stenosis, neuropathy, MICHELINE, hypothyroidism, BPH, HTN, dyslipidemia, CAD s/p PCI/OLESYA of the prox to mid LAD (10/19/23), severe AS s/p TAVR (Jennifre valve) in 12/5/23, s/p dual chamber PPM (Medtronic) at [facility] (12/13/23) for second degree AVB, mobitz type 1, with paroxysmal AFIB starting in May 2024, noted on his PPM interrogation.   He was started on Eliquis, and underwent BRIAN/DCCV on 9/13/24.  He did have pulm edema few hours after discharged from his cardioversion requiring admission for diuresis.  He was found to have recurrent AFIB on Remote Monitor from his PPM.  he reports having fatigue and GARSIA since January this year.  He presented today for an elective EPS / AFIB ablation.  However, procedure is cancelled due to hematuria that started on 11/18 evening.   He states that he had unprovoked painless hematuria 2 months ago that lasted for 3 days and self resolved.  He was taking Eliquis during that time.  He saw Urologist (Dr. Alfa Larios) a month ago and had cystoscopy. Per patient, there was tiny stone but no cancer.  He denies dysuria, fever, chills.  No apparent clots in urine.    He presented to the ER today mistakenly instead of going to Admission office.  He reported "SOB" as part of his symptoms.  As such, the usual blood work was sent, including Troponin T, which came back to be 55.  He denies angina with exertion.  He admits to running out of his Clopidogrel for about 1 week and couldn't get refill from Dr. Dueñas's office somehow.      1. Persistent AFIB --- continue eliquis at this time. Continue home Metoprolol ER 50 mg daily. Given hematuria, Ablation procedure is canceled today.  His VSS. Unchanged symptoms from baseline. Thus, plan to go home today.   2. Hematuria -- recurrent 2nd episode.  He is instructed to f/u with his urologist outpatient.   3. h/o CAD iwth PCI in 10/2023 -- no angina at rest or with exertion.  He ran out of Clopidogrel for about 1 week.  In light of hematuria, Dr. Villarreal has reached out to his cardiologist Dr. Dueñas and his Urologist Dr. Larios to see if he can be / should be back on this.  Hold off restarting it for now.    4. Dispo -- he can be discharged from [facility] holding area tonight. His sister Leana is here to drive him home.

## 2024-11-19 NOTE — ED ADULT TRIAGE NOTE - CHIEF COMPLAINT QUOTE
85 y/o male sent by cardiology for admission, Pt with hx of Afib, states "They will do an ablation" Pt denies chest pain, endorses SOB.

## 2024-11-19 NOTE — H&P ADULT - NSICDXPASTMEDICALHX_GEN_ALL_CORE_FT
Alert and oriented to person, place, time/situation. normal mood and affect.
PAST MEDICAL HISTORY:  CAD (coronary artery disease)     Dyslipidemia     Hematuria     History of BPH     HTN (hypertension)     Hypothyroidism     Lumbar spinal stenosis     Neuropathy     MICHELINE (obstructive sleep apnea)     Psoriatic arthritis

## 2024-11-19 NOTE — ED ADULT NURSE NOTE - NSFALLHARMRISKINTERV_ED_ALL_ED

## 2024-11-19 NOTE — H&P ADULT - HISTORY OF PRESENT ILLNESS
HISTORY OF PRESENT ILLNESS:     83 y/o M, former smoker, with history of BPH,  psoriatic arthritis (on methotrexate), lumbar spinal stenosis, neuropathy, MICHELINE, hypothyroidism, BPH, HTN, dyslipidemia, CAD s/p PCI/OLESYA of the prox to mid LAD (10/19/23), severe AS s/p TAVR (Jennifer valve) in 12/5/23, s/p dual chamber PPM (Medtronic) at [facility] (12/13/23) for second degree AVB, mobitz type 1, with paroxysmal AFIB starting in May 2024, noted on his PPM interrogation.   He was started on Eliquis, and underwent BRIAN/DCCV on 9/13/24.  He did have pulm edema few hours after discharged from his cardioversion requiring admission for diuresis.  He was found to have recurrent AFIB on Remote Monitor from his PPM.  he reports having fatigue and GARSIA since January this year.  He presented today for an elective EPS / AFIB ablation.  However, procedure is cancelled due to hematuria that started on 11/18 evening.   He states that he had unprovoked painless hematuria 2 months ago that lasted for 3 days and self resolved.  He was taking Eliquis during that time.  He saw Urologist (Dr. Alfa Larios) a month ago and had cystoscopy. Per patient, there was tiny stone but no cancer.  He denies dysuria, fever, chills.  No apparent clots in urine.    He presented to the ER today mistakenly instead of going to Admission office.  He reported "SOB" as part of his symptoms.  As such, the usual blood work was sent, including Troponin T, which came back to be 55.  He denies angina with exertion.         PAST MEDICAL & SURGICAL HISTORY:  Psoriatic arthritis  Lumbar spinal stenosis  Neuropathy  MICHELINE (obstructive sleep apnea)  Hypothyroidism  History of BPH  HTN (hypertension)  Dyslipidemia  CAD (coronary artery disease)  S/P appendectomy  S/P cholecystectomy  S/P hip replacement, left  H/O total knee replacement, right  s/p TAVR   s/p PPM (medtronic 2023)         FAMILY HISTORY:  Mother - heart failure  Father - cardiac       SOCIAL HISTORY:    quit smoking 40 years ago (smoked for 10 years)       Allergies  No Known Allergies      	          REVIEW OF SYSTEMS:  CONSTITUTIONAL: No fever, weight loss,. FATIGUE   EYES: No eye pain, visual disturbances, or discharge  ENMT:  No difficulty hearing, tinnitus, vertigo; No sinus or throat pain  NECK: No pain or stiffness  RESPIRATORY: No cough, wheezing, chills or hemoptysis; No Shortness of Breath  CARDIOVASCULAR: No chest pain, palpitations, dizziness, or leg swelling  GASTROINTESTINAL: No abdominal or epigastric pain. No nausea, vomiting, or hematemesis; No diarrhea or constipation. No melena or hematochezia.  GENITOURINARY: SEE HPI   NEUROLOGICAL: No headaches, memory loss, loss of strength, numbness, or tremors  SKIN: No itching, burning, rashes, or lesions   LYMPH Nodes: No enlarged glands  ENDOCRINE: No heat or cold intolerance; No hair loss  MUSCULOSKELETAL: No joint pain or swelling; No muscle, back, or extremity pain  PSYCHIATRIC: No depression, anxiety, mood swings, or difficulty sleeping  HEME/LYMPH: No easy bruising, or bleeding gums  ALLERY AND IMMUNOLOGIC: No hives or eczema	      PHYSICAL EXAM:  Vital Signs Last 24 Hrs  T(C): 36.3 (19 Nov 2024 12:09), Max: 36.3 (19 Nov 2024 12:09)  T(F): 97.3 (19 Nov 2024 12:09), Max: 97.3 (19 Nov 2024 12:09)  HR: 90 (19 Nov 2024 12:09) (90 - 90)  BP: 115/75 (19 Nov 2024 12:09) (115/75 - 115/75)  RR: 18 (19 Nov 2024 12:09) (18 - 18)  SpO2: 94% (19 Nov 2024 12:09) (94% - 94%)    Parameters below as of 19 Nov 2024 12:09  Patient On (Oxygen Delivery Method): room air        Constitutional: NAD	  HEENT:   Normal oral mucosa, PERRL, EOMI	  Neck: No JVD  CVS: Normal S1 / S2, Regular, PPM (upper left chest wall).   Pulm: CTA. No wheeze or rale  GI:  + BS, soft, NT / ND   Ext: No LE edema  Neurologic: A&O x 3, Non-focal  Psych: Pleasant, has good insight  Skin: No rash or lesion       	  LABS:	                         10.9   8.66  )-----------( 124      ( 19 Nov 2024 13:21 )             32.8     11-19    141  |  107  |  24[H]  ----------------------------<  113[H]  4.6   |  24  |  1.51[H]    Ca    8.9      19 Nov 2024 13:21    TPro  6.1  /  Alb  4.0  /  TBili  1.7[H]  /  DBili  x   /  AST  20  /  ALT  15  /  AlkPhos  125[H]  11-19      EKG: AFIB,  81 bpm.     Echo: EF 64%. Bioprosthetic aortic valve with no intravalvular regurgitation and no stenosis. No pericardial effusion.

## 2024-11-19 NOTE — ED ADULT NURSE NOTE - OBJECTIVE STATEMENT
85 yo male c/o SOB x4 months. Hx afib, on eliquis, last dose this morning, has a scheduled ablation today. Reports brownish blood in his urine this morning. Denies urinary complaints. Denies CP, palpitations, dizziness. AAOx4, NAD, ambulatory with steady gait.

## 2024-11-19 NOTE — ED PROVIDER NOTE - CLINICAL SUMMARY MEDICAL DECISION MAKING FREE TEXT BOX
84 year old male with history of Afib on Eliquis, s/p medtronic PPM, HTN, hypothyroidism, psoriatic arthritis, sent by Dr. Memo Ventura for ablation. 84 year old male with history of Afib on Eliquis, s/p medtronic PPM, HTN, hypothyroidism, psoriatic arthritis, sent by Dr. Memo Ventura for ablation. Well appearing here with good vitals, EKG paced nonischemic, warm and well perfused on exam, no active symptoms. Spoke with EP team--patient was supposed to be a direct admit--he did not know this and came to ER. Will admit to EP team after pIV access is established and preop labs are sent.

## 2024-11-19 NOTE — ED PROVIDER NOTE - OBJECTIVE STATEMENT
84 year old male with history of Afib on Eliquis, s/p medtronic PPM, HTN, hypothyroidism, psoriatic arthritis, sent by Dr. Memo Ventura for ablation. States he has been having SOB for months and is now sent by his EP for ablation of Afib. No chest pain, fever, chills, cough, sore throat, congestion, leg swelling, ab pain, n/v/d.

## 2024-11-23 DIAGNOSIS — Z90.49 ACQUIRED ABSENCE OF OTHER SPECIFIED PARTS OF DIGESTIVE TRACT: ICD-10-CM

## 2024-11-23 DIAGNOSIS — G47.33 OBSTRUCTIVE SLEEP APNEA (ADULT) (PEDIATRIC): ICD-10-CM

## 2024-11-23 DIAGNOSIS — N40.0 BENIGN PROSTATIC HYPERPLASIA WITHOUT LOWER URINARY TRACT SYMPTOMS: ICD-10-CM

## 2024-11-23 DIAGNOSIS — I48.19 OTHER PERSISTENT ATRIAL FIBRILLATION: ICD-10-CM

## 2024-11-23 DIAGNOSIS — E03.9 HYPOTHYROIDISM, UNSPECIFIED: ICD-10-CM

## 2024-11-23 DIAGNOSIS — L40.50 ARTHROPATHIC PSORIASIS, UNSPECIFIED: ICD-10-CM

## 2024-11-23 DIAGNOSIS — Z79.899 OTHER LONG TERM (CURRENT) DRUG THERAPY: ICD-10-CM

## 2024-11-23 DIAGNOSIS — M48.061 SPINAL STENOSIS, LUMBAR REGION WITHOUT NEUROGENIC CLAUDICATION: ICD-10-CM

## 2024-11-23 DIAGNOSIS — Z95.3 PRESENCE OF XENOGENIC HEART VALVE: ICD-10-CM

## 2024-11-23 DIAGNOSIS — Z79.890 HORMONE REPLACEMENT THERAPY: ICD-10-CM

## 2024-11-23 DIAGNOSIS — I10 ESSENTIAL (PRIMARY) HYPERTENSION: ICD-10-CM

## 2024-11-23 DIAGNOSIS — Z53.8 PROCEDURE AND TREATMENT NOT CARRIED OUT FOR OTHER REASONS: ICD-10-CM

## 2024-11-23 DIAGNOSIS — Z95.0 PRESENCE OF CARDIAC PACEMAKER: ICD-10-CM

## 2024-11-23 DIAGNOSIS — I25.10 ATHEROSCLEROTIC HEART DISEASE OF NATIVE CORONARY ARTERY WITHOUT ANGINA PECTORIS: ICD-10-CM

## 2024-11-23 DIAGNOSIS — Z95.5 PRESENCE OF CORONARY ANGIOPLASTY IMPLANT AND GRAFT: ICD-10-CM

## 2024-11-23 DIAGNOSIS — R31.9 HEMATURIA, UNSPECIFIED: ICD-10-CM

## 2024-11-23 DIAGNOSIS — Z79.02 LONG TERM (CURRENT) USE OF ANTITHROMBOTICS/ANTIPLATELETS: ICD-10-CM

## 2024-11-23 DIAGNOSIS — Z96.642 PRESENCE OF LEFT ARTIFICIAL HIP JOINT: ICD-10-CM

## 2024-11-23 DIAGNOSIS — Z79.01 LONG TERM (CURRENT) USE OF ANTICOAGULANTS: ICD-10-CM

## 2024-11-23 DIAGNOSIS — Z96.651 PRESENCE OF RIGHT ARTIFICIAL KNEE JOINT: ICD-10-CM

## 2024-11-23 DIAGNOSIS — Z79.82 LONG TERM (CURRENT) USE OF ASPIRIN: ICD-10-CM

## 2024-11-23 DIAGNOSIS — Z87.891 PERSONAL HISTORY OF NICOTINE DEPENDENCE: ICD-10-CM

## 2024-11-23 DIAGNOSIS — G62.9 POLYNEUROPATHY, UNSPECIFIED: ICD-10-CM

## 2024-11-23 DIAGNOSIS — Z82.49 FAMILY HISTORY OF ISCHEMIC HEART DISEASE AND OTHER DISEASES OF THE CIRCULATORY SYSTEM: ICD-10-CM

## 2024-11-26 PROCEDURE — 84484 ASSAY OF TROPONIN QUANT: CPT

## 2024-11-26 PROCEDURE — 87086 URINE CULTURE/COLONY COUNT: CPT

## 2024-11-26 PROCEDURE — 80053 COMPREHEN METABOLIC PANEL: CPT

## 2024-11-26 PROCEDURE — 36415 COLL VENOUS BLD VENIPUNCTURE: CPT

## 2024-11-26 PROCEDURE — 85025 COMPLETE CBC W/AUTO DIFF WBC: CPT

## 2024-11-26 PROCEDURE — 86850 RBC ANTIBODY SCREEN: CPT

## 2024-11-26 PROCEDURE — 86901 BLOOD TYPING SEROLOGIC RH(D): CPT

## 2024-11-26 PROCEDURE — 81001 URINALYSIS AUTO W/SCOPE: CPT

## 2024-11-26 PROCEDURE — 99285 EMERGENCY DEPT VISIT HI MDM: CPT | Mod: 25

## 2024-11-26 PROCEDURE — 86900 BLOOD TYPING SEROLOGIC ABO: CPT

## 2024-11-26 PROCEDURE — 85610 PROTHROMBIN TIME: CPT

## 2024-11-26 PROCEDURE — 85730 THROMBOPLASTIN TIME PARTIAL: CPT

## 2025-01-07 ENCOUNTER — INPATIENT (INPATIENT)
Facility: HOSPITAL | Age: 85
LOS: 0 days | Discharge: HOME | End: 2025-01-08
Attending: STUDENT IN AN ORGANIZED HEALTH CARE EDUCATION/TRAINING PROGRAM | Admitting: STUDENT IN AN ORGANIZED HEALTH CARE EDUCATION/TRAINING PROGRAM
Payer: MEDICARE

## 2025-01-07 VITALS — WEIGHT: 169.98 LBS | HEIGHT: 70 IN

## 2025-01-07 DIAGNOSIS — Z96.642 PRESENCE OF LEFT ARTIFICIAL HIP JOINT: Chronic | ICD-10-CM

## 2025-01-07 DIAGNOSIS — Z96.651 PRESENCE OF RIGHT ARTIFICIAL KNEE JOINT: Chronic | ICD-10-CM

## 2025-01-07 DIAGNOSIS — Z90.49 ACQUIRED ABSENCE OF OTHER SPECIFIED PARTS OF DIGESTIVE TRACT: Chronic | ICD-10-CM

## 2025-01-07 DIAGNOSIS — I48.19 OTHER PERSISTENT ATRIAL FIBRILLATION: ICD-10-CM

## 2025-01-07 PROBLEM — R31.9 HEMATURIA, UNSPECIFIED: Chronic | Status: ACTIVE | Noted: 2024-11-19

## 2025-01-07 LAB
ANION GAP SERPL CALC-SCNC: 13 MMOL/L — SIGNIFICANT CHANGE UP (ref 5–17)
APTT BLD: 37.2 SEC — HIGH (ref 24.5–35.6)
BLD GP AB SCN SERPL QL: NEGATIVE — SIGNIFICANT CHANGE UP
BUN SERPL-MCNC: 31 MG/DL — HIGH (ref 7–23)
CALCIUM SERPL-MCNC: 9.4 MG/DL — SIGNIFICANT CHANGE UP (ref 8.4–10.5)
CHLORIDE SERPL-SCNC: 108 MMOL/L — SIGNIFICANT CHANGE UP (ref 96–108)
CO2 SERPL-SCNC: 22 MMOL/L — SIGNIFICANT CHANGE UP (ref 22–31)
CREAT SERPL-MCNC: 1.53 MG/DL — HIGH (ref 0.5–1.3)
EGFR: 44 ML/MIN/1.73M2 — LOW
GLUCOSE SERPL-MCNC: 117 MG/DL — HIGH (ref 70–99)
HAPTOGLOB SERPL-MCNC: <10 MG/DL — LOW (ref 34–200)
HCT VFR BLD CALC: 25.8 % — LOW (ref 39–50)
HCT VFR BLD CALC: 34.4 % — LOW (ref 39–50)
HGB BLD-MCNC: 11.1 G/DL — LOW (ref 13–17)
HGB BLD-MCNC: 8.2 G/DL — LOW (ref 13–17)
INR BLD: 3.06 — HIGH (ref 0.85–1.16)
LDH SERPL L TO P-CCNC: 255 U/L — HIGH (ref 50–242)
MAGNESIUM SERPL-MCNC: 1.7 MG/DL — SIGNIFICANT CHANGE UP (ref 1.6–2.6)
MCHC RBC-ENTMCNC: 31.8 G/DL — LOW (ref 32–36)
MCHC RBC-ENTMCNC: 32.3 G/DL — SIGNIFICANT CHANGE UP (ref 32–36)
MCHC RBC-ENTMCNC: 32.9 PG — SIGNIFICANT CHANGE UP (ref 27–34)
MCHC RBC-ENTMCNC: 33.3 PG — SIGNIFICANT CHANGE UP (ref 27–34)
MCV RBC AUTO: 103.3 FL — HIGH (ref 80–100)
MCV RBC AUTO: 103.6 FL — HIGH (ref 80–100)
NRBC # BLD: 0 /100 WBCS — SIGNIFICANT CHANGE UP (ref 0–0)
NRBC # BLD: 0 /100 WBCS — SIGNIFICANT CHANGE UP (ref 0–0)
PLATELET # BLD AUTO: 112 K/UL — LOW (ref 150–400)
PLATELET # BLD AUTO: 136 K/UL — LOW (ref 150–400)
POTASSIUM SERPL-MCNC: 4.1 MMOL/L — SIGNIFICANT CHANGE UP (ref 3.5–5.3)
POTASSIUM SERPL-SCNC: 4.1 MMOL/L — SIGNIFICANT CHANGE UP (ref 3.5–5.3)
PROTHROM AB SERPL-ACNC: 35.4 SEC — HIGH (ref 9.9–13.4)
RBC # BLD: 2.49 M/UL — LOW (ref 4.2–5.8)
RBC # BLD: 3.33 M/UL — LOW (ref 4.2–5.8)
RBC # FLD: 16.4 % — HIGH (ref 10.3–14.5)
RBC # FLD: 16.6 % — HIGH (ref 10.3–14.5)
RH IG SCN BLD-IMP: POSITIVE — SIGNIFICANT CHANGE UP
SODIUM SERPL-SCNC: 143 MMOL/L — SIGNIFICANT CHANGE UP (ref 135–145)
WBC # BLD: 6.07 K/UL — SIGNIFICANT CHANGE UP (ref 3.8–10.5)
WBC # BLD: 8.73 K/UL — SIGNIFICANT CHANGE UP (ref 3.8–10.5)
WBC # FLD AUTO: 6.07 K/UL — SIGNIFICANT CHANGE UP (ref 3.8–10.5)
WBC # FLD AUTO: 8.73 K/UL — SIGNIFICANT CHANGE UP (ref 3.8–10.5)

## 2025-01-07 RX ORDER — ASPIRIN 81 MG
81 TABLET, DELAYED RELEASE (ENTERIC COATED) ORAL DAILY
Refills: 0 | Status: DISCONTINUED | OUTPATIENT
Start: 2025-01-07 | End: 2025-01-08

## 2025-01-07 RX ORDER — TAMSULOSIN HYDROCHLORIDE 0.4 MG/1
0.4 CAPSULE ORAL AT BEDTIME
Refills: 0 | Status: DISCONTINUED | OUTPATIENT
Start: 2025-01-07 | End: 2025-01-08

## 2025-01-07 RX ORDER — MODAFINIL 100 MG/1
200 TABLET ORAL DAILY
Refills: 0 | Status: DISCONTINUED | OUTPATIENT
Start: 2025-01-07 | End: 2025-01-08

## 2025-01-07 RX ORDER — MODAFINIL 100 MG/1
1 TABLET ORAL
Refills: 0 | DISCHARGE

## 2025-01-07 RX ORDER — FUROSEMIDE 20 MG
40 TABLET ORAL DAILY
Refills: 0 | Status: DISCONTINUED | OUTPATIENT
Start: 2025-01-08 | End: 2025-01-08

## 2025-01-07 RX ORDER — LEVOTHYROXINE SODIUM 175 UG/1
100 TABLET ORAL DAILY
Refills: 0 | Status: DISCONTINUED | OUTPATIENT
Start: 2025-01-07 | End: 2025-01-08

## 2025-01-07 RX ORDER — LEVOTHYROXINE SODIUM 175 UG/1
1 TABLET ORAL
Refills: 0 | DISCHARGE

## 2025-01-07 RX ORDER — FINASTERIDE 5 MG
5 TABLET ORAL DAILY
Refills: 0 | Status: DISCONTINUED | OUTPATIENT
Start: 2025-01-07 | End: 2025-01-08

## 2025-01-07 RX ORDER — VITAMIN A 10000 UNIT
1 TABLET ORAL DAILY
Refills: 0 | Status: DISCONTINUED | OUTPATIENT
Start: 2025-01-07 | End: 2025-01-08

## 2025-01-07 RX ORDER — APIXABAN 5 MG/1
5 TABLET, FILM COATED ORAL EVERY 12 HOURS
Refills: 0 | Status: DISCONTINUED | OUTPATIENT
Start: 2025-01-07 | End: 2025-01-08

## 2025-01-07 RX ORDER — PANTOPRAZOLE 40 MG/1
1 TABLET, DELAYED RELEASE ORAL
Refills: 0 | DISCHARGE

## 2025-01-07 RX ORDER — ATORVASTATIN CALCIUM 40 MG/1
40 TABLET, FILM COATED ORAL AT BEDTIME
Refills: 0 | Status: DISCONTINUED | OUTPATIENT
Start: 2025-01-07 | End: 2025-01-08

## 2025-01-07 RX ORDER — APIXABAN 5 MG/1
1 TABLET, FILM COATED ORAL
Refills: 0 | DISCHARGE

## 2025-01-07 RX ORDER — PANTOPRAZOLE 40 MG/1
40 TABLET, DELAYED RELEASE ORAL
Refills: 0 | Status: DISCONTINUED | OUTPATIENT
Start: 2025-01-07 | End: 2025-01-08

## 2025-01-07 RX ORDER — FINASTERIDE 5 MG
1 TABLET ORAL
Refills: 0 | DISCHARGE

## 2025-01-07 RX ORDER — FUROSEMIDE 20 MG
20 TABLET ORAL ONCE
Refills: 0 | Status: COMPLETED | OUTPATIENT
Start: 2025-01-07 | End: 2025-01-07

## 2025-01-07 RX ADMIN — Medication 20 MILLIGRAM(S): at 19:52

## 2025-01-07 RX ADMIN — Medication 5 MILLIGRAM(S): at 22:35

## 2025-01-07 RX ADMIN — ATORVASTATIN CALCIUM 40 MILLIGRAM(S): 40 TABLET, FILM COATED ORAL at 21:37

## 2025-01-07 RX ADMIN — APIXABAN 5 MILLIGRAM(S): 5 TABLET, FILM COATED ORAL at 21:37

## 2025-01-07 RX ADMIN — TAMSULOSIN HYDROCHLORIDE 0.4 MILLIGRAM(S): 0.4 CAPSULE ORAL at 21:37

## 2025-01-07 NOTE — H&P ADULT - ASSESSMENT
84 yo M with history of LVOT obstruction  HTN, CAD, s/p PCI , severs AS, s/p TAVR, s/  p MDT PPM for second degree AVB , atrial fibrillation on Eliquis, s/p BRIAN and DCCV with recurrent atrial fibrillation presents today for scheduled ablation.

## 2025-01-07 NOTE — H&P ADULT - NSICDXPASTMEDICALHX_GEN_ALL_CORE_FT
PAST MEDICAL HISTORY:  CAD (coronary artery disease)     Dyslipidemia     Hematuria     History of BPH     HTN (hypertension)     Hypothyroidism     Lumbar spinal stenosis     Neuropathy     MICHELINE (obstructive sleep apnea)     Psoriatic arthritis

## 2025-01-07 NOTE — H&P ADULT - HISTORY OF PRESENT ILLNESS
82 yo M with history of LVOT obstruction  HTN, CAD, s/p PCI , severs AS, s/p TAVR, s/  p MDT PPM for second degree AVB , atrial fibrillation on Eliquis, s/p BRIAN and DCCV with recurrent atrial fibrillation presents today for scheduled ablation.   Patient reports symptoms of SOB associated with  atrial fibrillation, denies chest pain, dizziness, syncope.   He is complaint with Eliquis last dose 1/7/25, no bleeding issues.

## 2025-01-07 NOTE — BRIEF OPERATIVE NOTE - COMMENTS
A user error has taken place: encounter opened in error, closed for administrative reasons.  
Patient's wife called on behalf of the patient requesting a re-fill for    lisinopril (PRINIVIL,ZESTRIL) 20 MG tablet    cetirizine (zyrTEC) 10 MG tablet    tamsulosin (FLOMAX) 0.4 MG capsule 24 hr capsule    [facility] confirmed    Best call back # [phone]   
Will refill but he doesn't have a future apt. Please make him a physical for January or Feb.
Lasix 20mg IV today and 40mg PO x 3 days  Continue a/c and home meds without interruption  F/up with Dr. Villarreal

## 2025-01-07 NOTE — PRE-ANESTHESIA EVALUATION ADULT - NSANTHPMHFT_GEN_ALL_CORE
83 y/o M, former smoker, with history of BPH,  psoriatic arthritis (on methotrexate), lumbar spinal stenosis, neuropathy, MICHELINE, hypothyroidism, BPH, HTN, dyslipidemia, CAD s/p PCI/OLESYA of the prox to mid LAD (10/19/23), severe AS s/p TAVR (Jennifer valve) in 12/5/23, s/p dual chamber PPM ([employer]tr[employer]) at [facility] (12/13/23) for second degree AVB, mobitz type 1, with paroxysmal AFIB starting in May 2024, noted on his PPM interrogation.   He was started on Eliquis, and underwent BRIAN/DCCV on 9/13/24.  He did have pulm edema few hours after discharged from his cardioversion requiring admission for diuresis.  He was found to have recurrent AFIB on Remote Monitor from his PPM.  he reports having fatigue and GARSIA since January this year.  He presented today for an elective EPS / AFIB ablation.  However, procedure is cancelled due to hematuria that started on 11/18 evening.   He states that he had unprovoked painless hematuria 2 months ago that lasted for 3 days and self resolved.  He was taking Eliquis during that time.  He saw Urologist (Dr. Alfa Larios) a month ago and had cystoscopy. Per patient, there was tiny stone but no cancer.  He denies dysuria, fever, chills.  No apparent clots in urine.    He presented to the ER today mistakenly instead of going to Admission office.  He reported "SOB" as part of his symptoms.  As such, the usual blood work was sent, including Troponin T, which came back to be 55.  He denies angina with exertion.

## 2025-01-07 NOTE — BRIEF OPERATIVE NOTE - OPERATION/FINDINGS
S/p bilateral femoral vein access with single transseptal puncture and LA map demonstrating abnormal patchy voltage throughout. S/p PVI, posterior wall, and DCCV from AFib to typical flutter with CTI line and conversion to sinus during ablation. Trace pericardial effusion, stable throughout. Heparinized throughout and reversed with protamine. Vascade for hemostasis. Pacemaker programming pre and post ablation stable and unchanged.

## 2025-01-08 ENCOUNTER — TRANSCRIPTION ENCOUNTER (OUTPATIENT)
Age: 85
End: 2025-01-08

## 2025-01-08 VITALS
SYSTOLIC BLOOD PRESSURE: 138 MMHG | RESPIRATION RATE: 18 BRPM | DIASTOLIC BLOOD PRESSURE: 73 MMHG | TEMPERATURE: 98 F | HEART RATE: 100 BPM | OXYGEN SATURATION: 93 %

## 2025-01-08 PROCEDURE — 86901 BLOOD TYPING SEROLOGIC RH(D): CPT

## 2025-01-08 PROCEDURE — C9399: CPT

## 2025-01-08 PROCEDURE — C1730: CPT

## 2025-01-08 PROCEDURE — C1766: CPT

## 2025-01-08 PROCEDURE — 83051 HEMOGLOBIN PLASMA: CPT

## 2025-01-08 PROCEDURE — 82330 ASSAY OF CALCIUM: CPT

## 2025-01-08 PROCEDURE — 82947 ASSAY GLUCOSE BLOOD QUANT: CPT

## 2025-01-08 PROCEDURE — 84295 ASSAY OF SERUM SODIUM: CPT

## 2025-01-08 PROCEDURE — 83735 ASSAY OF MAGNESIUM: CPT

## 2025-01-08 PROCEDURE — 80048 BASIC METABOLIC PNL TOTAL CA: CPT

## 2025-01-08 PROCEDURE — 85730 THROMBOPLASTIN TIME PARTIAL: CPT

## 2025-01-08 PROCEDURE — 36415 COLL VENOUS BLD VENIPUNCTURE: CPT

## 2025-01-08 PROCEDURE — 84132 ASSAY OF SERUM POTASSIUM: CPT

## 2025-01-08 PROCEDURE — 83615 LACTATE (LD) (LDH) ENZYME: CPT

## 2025-01-08 PROCEDURE — C1760: CPT

## 2025-01-08 PROCEDURE — 82565 ASSAY OF CREATININE: CPT

## 2025-01-08 PROCEDURE — 83010 ASSAY OF HAPTOGLOBIN QUANT: CPT

## 2025-01-08 PROCEDURE — 85610 PROTHROMBIN TIME: CPT

## 2025-01-08 PROCEDURE — 86850 RBC ANTIBODY SCREEN: CPT

## 2025-01-08 PROCEDURE — C1769: CPT

## 2025-01-08 PROCEDURE — C1733: CPT

## 2025-01-08 PROCEDURE — 82803 BLOOD GASES ANY COMBINATION: CPT

## 2025-01-08 PROCEDURE — C1759: CPT

## 2025-01-08 PROCEDURE — 85014 HEMATOCRIT: CPT

## 2025-01-08 PROCEDURE — C1894: CPT

## 2025-01-08 PROCEDURE — 85347 COAGULATION TIME ACTIVATED: CPT

## 2025-01-08 PROCEDURE — 86900 BLOOD TYPING SEROLOGIC ABO: CPT

## 2025-01-08 PROCEDURE — 85027 COMPLETE CBC AUTOMATED: CPT

## 2025-01-08 RX ORDER — FUROSEMIDE 20 MG
1 TABLET ORAL
Qty: 0 | Refills: 0 | DISCHARGE
Start: 2025-01-08

## 2025-01-08 RX ORDER — FUROSEMIDE 20 MG
1 TABLET ORAL
Qty: 3 | Refills: 0
Start: 2025-01-08 | End: 2025-01-10

## 2025-01-08 RX ADMIN — Medication 5 MILLIGRAM(S): at 12:18

## 2025-01-08 RX ADMIN — Medication 81 MILLIGRAM(S): at 12:18

## 2025-01-08 RX ADMIN — MODAFINIL 200 MILLIGRAM(S): 100 TABLET ORAL at 12:19

## 2025-01-08 RX ADMIN — LEVOTHYROXINE SODIUM 100 MICROGRAM(S): 175 TABLET ORAL at 05:31

## 2025-01-08 RX ADMIN — Medication 1 MILLIGRAM(S): at 12:19

## 2025-01-08 RX ADMIN — APIXABAN 5 MILLIGRAM(S): 5 TABLET, FILM COATED ORAL at 09:37

## 2025-01-08 RX ADMIN — PANTOPRAZOLE 40 MILLIGRAM(S): 40 TABLET, DELAYED RELEASE ORAL at 05:31

## 2025-01-08 RX ADMIN — Medication 40 MILLIGRAM(S): at 05:31

## 2025-01-08 NOTE — DISCHARGE NOTE PROVIDER - NSDCFUADDAPPT_GEN_ALL_CORE_FT
Follow up Dr. Villarreal, call his office [phone] to schedule an appointment or if you have questions.   Avoid heavy lifting, exercise and strenuous activity for 7 days.   Showering is OK  Prescription for Lasix 40 mg was sent to your pharmacy.

## 2025-01-08 NOTE — DISCHARGE NOTE NURSING/CASE MANAGEMENT/SOCIAL WORK - FINANCIAL ASSISTANCE
[facility] provides services at a reduced cost to those who are determined to be eligible through [facility]’s financial assistance program. Information regarding [facility]’s financial assistance program can be found by going to [email or web address] or by calling [phone].

## 2025-01-08 NOTE — DISCHARGE NOTE PROVIDER - NSDCMRMEDTOKEN_GEN_ALL_CORE_FT
aspirin 81 mg oral delayed release tablet: 1 tab(s) orally once a day  atorvastatin 40 mg oral tablet: 1 tab(s) orally once a day  Eliquis 5 mg oral tablet: 1 tab(s) orally 2 times a day  finasteride 5 mg oral tablet: 1 tab(s) orally once a day  folic acid 1 mg oral tablet: 3 tab(s) orally once a day  furosemide 40 mg oral tablet: 1 tab(s) orally once a day  levothyroxine 100 mcg (0.1 mg) oral tablet: 1 tab(s) orally once a day  methotrexate 2.5 mg oral tablet: 6 tab(s) orally once a week  pantoprazole 40 mg oral delayed release tablet: 1 tab(s) orally once a day  Provigil 200 mg oral tablet: 1 tab(s) orally once a day  tamsulosin 0.4 mg oral capsule: 1 cap(s) orally once a day

## 2025-01-08 NOTE — DISCHARGE NOTE PROVIDER - HOSPITAL COURSE
84 yo M with history of LVOT obstruction  HTN, CAD, s/p PCI , severs AS, s/p TAVR, s/  p MDT PPM for second degree AVB , atrial fibrillation on Eliquis, s/p BRIAN and DCCV with recurrent atrial fibrillation presents today for scheduled ablation. He is complaint with Eliquis last dose 1/7/25, no bleeding issues.     Patient had successful ablation on 1/7/25, there were no complications.  He was monitored on telemetry overnight, Ap/ no arrhthymias noted  Bl groin check no bleeding, no swelling, no hematoma , + ecchymosis R groin  Patient was started on Lasix post procedure  Stable for discharge.

## 2025-01-08 NOTE — DISCHARGE NOTE PROVIDER - NSDCFUADDINST_GEN_ALL_CORE_FT
When you do shower  wash the site gently with soap and water. Rinse well and pat dry. No tub baths for the next 7 days (including whirl pools, spas and swimming).    - Do not drive for 48-72 hours following your procedure. If you have questions regarding when to resume driving, contact your physician's office.    - You may resume your previous diet.    - Refer to your medication reconciliation list given at the time of discharge regarding medication instructions.    - No heavy lifting or straining for one week. No physically strenuous activity (i.e. sexual intercourse, exercise) for one week (i.e. not lifting anything greater than 10 pounds).    - A follow-up visit should be scheduled within 4-6 weeks of the procedure.    - Call your physician if you have worsening symptoms, including:.    o Fever >101F, or any other signs of infection (e.g., drainage, warmth, redness in the groin where the procedure was performed, productive cough, pain on urination).    o Numbness, cooling sensation, or tingling in the procedural legs.    o Shortness of breath or chest pain    o Difficulty swallowing    - For medical emergencies, seek immediate medical attention, including for:    o Palpitations/rapid heart rate    o Dizziness    o Shortness of breath/chest pain    o Passing out    o Numbness/weakness in the arms, legs, or face; visual/speech changes

## 2025-01-08 NOTE — DISCHARGE NOTE PROVIDER - CARE PROVIDER_API CALL
Memo Villarreal  Cardiac Electrophysiology  [address] [place] [id number]  Phone: [phone]  Fax: [phone]  Follow Up Time:

## 2025-01-08 NOTE — DISCHARGE NOTE NURSING/CASE MANAGEMENT/SOCIAL WORK - PATIENT PORTAL LINK FT
You can access the FollowMyHealth Patient Portal offered by [facility] by registering at the following website: [email or web address]. By joining [facility]’s FollowMyHealth portal, you will also be able to view your health information using other applications (apps) compatible with our system.

## 2025-01-08 NOTE — DISCHARGE NOTE PROVIDER - CARE PROVIDERS DIRECT ADDRESSES
[email or web address]

## 2025-01-08 NOTE — DISCHARGE NOTE NURSING/CASE MANAGEMENT/SOCIAL WORK - NSDCFUADDAPPT_GEN_ALL_CORE_FT
Follow up Dr. Villarreal, call his office [phone] to schedule an appointment or if you have questions.   Avoid heavy lifting, exercise and strenuous activity for 7 days.   Showering is OK  Prescription for Lasix 40 mg was sent to your pharmacy.

## 2025-01-08 NOTE — DISCHARGE NOTE NURSING/CASE MANAGEMENT/SOCIAL WORK - NSDCPEFALRISK_GEN_ALL_CORE
For information on Fall & Injury Prevention, visit: [email or web address] OR  [email or web address] OR  https://www.cdc.gov/steadi/patient.html

## 2025-01-13 LAB — HGB FREE PLAS-MCNC: 10.2 MG/DL — HIGH (ref 0–4.9)

## 2025-01-21 DIAGNOSIS — I50.32 CHRONIC DIASTOLIC (CONGESTIVE) HEART FAILURE: ICD-10-CM

## 2025-01-21 DIAGNOSIS — I48.19 OTHER PERSISTENT ATRIAL FIBRILLATION: ICD-10-CM

## 2025-01-21 DIAGNOSIS — I11.0 HYPERTENSIVE HEART DISEASE WITH HEART FAILURE: ICD-10-CM

## 2025-01-21 DIAGNOSIS — Z96.651 PRESENCE OF RIGHT ARTIFICIAL KNEE JOINT: ICD-10-CM

## 2025-01-21 DIAGNOSIS — E03.9 HYPOTHYROIDISM, UNSPECIFIED: ICD-10-CM

## 2025-01-21 DIAGNOSIS — Z96.642 PRESENCE OF LEFT ARTIFICIAL HIP JOINT: ICD-10-CM

## 2025-01-21 DIAGNOSIS — I25.10 ATHEROSCLEROTIC HEART DISEASE OF NATIVE CORONARY ARTERY WITHOUT ANGINA PECTORIS: ICD-10-CM

## 2025-01-21 DIAGNOSIS — G47.33 OBSTRUCTIVE SLEEP APNEA (ADULT) (PEDIATRIC): ICD-10-CM

## 2025-01-21 DIAGNOSIS — Z95.5 PRESENCE OF CORONARY ANGIOPLASTY IMPLANT AND GRAFT: ICD-10-CM

## 2025-01-21 DIAGNOSIS — Z79.82 LONG TERM (CURRENT) USE OF ASPIRIN: ICD-10-CM

## 2025-01-21 DIAGNOSIS — Z79.01 LONG TERM (CURRENT) USE OF ANTICOAGULANTS: ICD-10-CM

## 2025-01-21 DIAGNOSIS — Z95.0 PRESENCE OF CARDIAC PACEMAKER: ICD-10-CM

## 2025-01-21 DIAGNOSIS — Z95.3 PRESENCE OF XENOGENIC HEART VALVE: ICD-10-CM

## 2025-02-03 ENCOUNTER — RESULT REVIEW (OUTPATIENT)
Age: 85
End: 2025-02-03

## 2025-02-05 ENCOUNTER — TRANSCRIPTION ENCOUNTER (OUTPATIENT)
Age: 85
End: 2025-02-05

## 2025-08-05 ENCOUNTER — TRANSCRIPTION ENCOUNTER (OUTPATIENT)
Age: 85
End: 2025-08-05

## 2025-08-06 ENCOUNTER — TRANSCRIPTION ENCOUNTER (OUTPATIENT)
Age: 85
End: 2025-08-06

## 2025-08-07 ENCOUNTER — APPOINTMENT (OUTPATIENT)
Dept: CARE COORDINATION | Facility: HOME HEALTH | Age: 85
End: 2025-08-07
Payer: MEDICARE

## 2025-08-07 DIAGNOSIS — I45.9 CONDUCTION DISORDER, UNSPECIFIED: ICD-10-CM

## 2025-08-07 DIAGNOSIS — I35.0 NONRHEUMATIC AORTIC (VALVE) STENOSIS: ICD-10-CM

## 2025-08-07 DIAGNOSIS — I50.9 HEART FAILURE, UNSPECIFIED: ICD-10-CM

## 2025-08-07 DIAGNOSIS — N18.30 CHRONIC KIDNEY DISEASE, STAGE 3 UNSPECIFIED: ICD-10-CM

## 2025-08-07 DIAGNOSIS — E03.9 HYPOTHYROIDISM, UNSPECIFIED: ICD-10-CM

## 2025-08-07 DIAGNOSIS — L40.50 ARTHROPATHIC PSORIASIS, UNSPECIFIED: ICD-10-CM

## 2025-08-07 DIAGNOSIS — Z09 ENCOUNTER FOR FOLLOW-UP EXAMINATION AFTER COMPLETED TREATMENT FOR CONDITIONS OTHER THAN MALIGNANT NEOPLASM: ICD-10-CM

## 2025-08-07 DIAGNOSIS — I48.91 UNSPECIFIED ATRIAL FIBRILLATION: ICD-10-CM

## 2025-08-07 PROCEDURE — 99349 HOME/RES VST EST MOD MDM 40: CPT | Mod: 2W

## 2025-08-07 RX ORDER — APIXABAN 2.5 MG/1
2.5 TABLET, FILM COATED ORAL
Refills: 0 | Status: ACTIVE | COMMUNITY

## 2025-08-07 RX ORDER — LEVOTHYROXINE SODIUM 0.1 MG/1
100 TABLET ORAL
Refills: 0 | Status: ACTIVE | COMMUNITY

## 2025-08-07 RX ORDER — FUROSEMIDE 40 MG/1
40 TABLET ORAL
Refills: 0 | Status: ACTIVE | COMMUNITY

## 2025-08-08 ENCOUNTER — TRANSCRIPTION ENCOUNTER (OUTPATIENT)
Age: 85
End: 2025-08-08

## 2025-09-03 ENCOUNTER — RESULT REVIEW (OUTPATIENT)
Age: 85
End: 2025-09-03

## (undated) DEVICE — PACK OPEN HEART LNX

## (undated) DEVICE — NDL PERCU ECHOTIP 21G X 4CM

## (undated) DEVICE — SUT SILK 5-0 60" TIES

## (undated) DEVICE — SUT VICRYL 0 27" CT

## (undated) DEVICE — SUMP INTRACARDIAC/PERICARDIAL 20FR 1/4" ADULT

## (undated) DEVICE — Device

## (undated) DEVICE — POSITIONER FOAM EGG CRATE ULNAR 2PCS (PINK)

## (undated) DEVICE — PREP SCRUB BRUSH W CHG 4%

## (undated) DEVICE — FOLEY TRAY 16FR 5CC LF LUBRISIL ADVANCE TEMP CLOSED

## (undated) DEVICE — ELCTR REM POLYHESIVE ADULT PT RETURN 15FT

## (undated) DEVICE — WARMING BLANKET FULL UNDERBODY

## (undated) DEVICE — TOURNIQUET SET 12FR (1 RED, 1 BLUE, 3 CLEAR, 1 SNARE) 7"

## (undated) DEVICE — MANIFOLD ANGIO AUTOMD TRNDCR

## (undated) DEVICE — SUT PLEDGET 9MM X 4MM X 1.5MM

## (undated) DEVICE — PACING CABLE (BLUE) ATRIAL TEMP SCREW DOWN 12FT

## (undated) DEVICE — SUT VICRYL 2-0 27" CT-1

## (undated) DEVICE — TUBING SUCTION NONCONDUCTIVE 6MM X 12FT

## (undated) DEVICE — MARKING PEN W RULER

## (undated) DEVICE — SUT HOLDER INSERT FOR OCTOBASE STERNAL RETRACTOR

## (undated) DEVICE — DRSG MEPILEX 10 X 25CM (4 X 10") AG

## (undated) DEVICE — PACK HYBRID LHH

## (undated) DEVICE — CATH CV TRAY INSR ST UNIV

## (undated) DEVICE — RIGID ADULT SUCKER

## (undated) DEVICE — DRAPE OR CAMERA COVER

## (undated) DEVICE — SUT SILK 2-0 18" SH (POP-OFF)

## (undated) DEVICE — CATH CARDIAC RT CUSET 601201319

## (undated) DEVICE — DRSG QUICKCLOT HEMOSTATIC 4X4 FOIL

## (undated) DEVICE — SUT STAINLESS STEEL 7 4-18" CCS

## (undated) DEVICE — SUT PROLENE 4-0 36" RB-1

## (undated) DEVICE — PACING CABLE (BROWN) A/V TEMP SCREW DOWN 12FT

## (undated) DEVICE — DRAPE SURGICAL #1010

## (undated) DEVICE — DRSG BIOPATCH DISK W CHG 1" W 4.0MM HOLE

## (undated) DEVICE — SUT PROLENE 5-0 24" RB-1

## (undated) DEVICE — SUT PROLENE 6-0 30" RB-2

## (undated) DEVICE — DRSG TRACH DRAINAGE 4X4

## (undated) DEVICE — SYS DEL CONTROLLER ANGIOTOUCH

## (undated) DEVICE — SUT STAINLESS STEEL 6 4-18" CCS

## (undated) DEVICE — SYR MED A2000 SYRINGE KIT ACIST REUS

## (undated) DEVICE — SUT DOUBLE 6 WIRE STERNAL

## (undated) DEVICE — CHEST DRAIN PLEUR-EVAC DRY/WET ADULT-PEDS SINGLE (QUICK)

## (undated) DEVICE — SUT VICRYL 1 36" CTX UNDYED

## (undated) DEVICE — SUT ETHIBOND 3-0 36" RB-1

## (undated) DEVICE — PACK PROC CV DRAPE

## (undated) DEVICE — DRAPE SLUSH / WARMER 44 X 66"

## (undated) DEVICE — SUT TICRON 2-0 36" CV-316 DA

## (undated) DEVICE — SUT VICRYL 4-0 18" PS-2 UNDYED

## (undated) DEVICE — SUT NUROLON 1 18" OS-8 (POP-OFF)

## (undated) DEVICE — CATH NG SALEM SUMP 16FR

## (undated) DEVICE — ATRION QL BALLOON CATHETER INFLATION DEVICE 15ATM 10CC

## (undated) DEVICE — DRAPE ULTRASOUND TRANSDUCER COVER

## (undated) DEVICE — DRAPE SMALL W ADHESIVE APERTURE

## (undated) DEVICE — DRAPE PROBE COVER 5" X 96"

## (undated) DEVICE — TUBING EXTENSION HI PRESSURE FLEX 48"

## (undated) DEVICE — SUT PROLENE 3-0 36" SH-1

## (undated) DEVICE — DRAPE IOBAN 33" X 23"

## (undated) DEVICE — BAND RADIAL R 24CM REG

## (undated) DEVICE — ELCTR ZOLL DEFIBRILLATOR PAD NO REPLACEMENT

## (undated) DEVICE — SUT PLEDGET SOFT MEDIUM 1/4" X 1/8" X 1/16" X6

## (undated) DEVICE — TUBING IV EXTENSION 30"